# Patient Record
Sex: MALE | Race: WHITE | NOT HISPANIC OR LATINO | Employment: FULL TIME | URBAN - NONMETROPOLITAN AREA
[De-identification: names, ages, dates, MRNs, and addresses within clinical notes are randomized per-mention and may not be internally consistent; named-entity substitution may affect disease eponyms.]

---

## 2018-01-23 ENCOUNTER — OFFICE VISIT (OUTPATIENT)
Dept: CHIROPRACTIC MEDICINE | Facility: OTHER | Age: 33
End: 2018-01-23
Attending: CHIROPRACTOR
Payer: COMMERCIAL

## 2018-01-23 DIAGNOSIS — M99.02 SEGMENTAL AND SOMATIC DYSFUNCTION OF THORACIC REGION: ICD-10-CM

## 2018-01-23 DIAGNOSIS — M54.42 ACUTE LEFT-SIDED LOW BACK PAIN WITH LEFT-SIDED SCIATICA: ICD-10-CM

## 2018-01-23 DIAGNOSIS — M99.03 SEGMENTAL AND SOMATIC DYSFUNCTION OF LUMBAR REGION: Primary | ICD-10-CM

## 2018-01-23 PROCEDURE — 98940 CHIROPRACT MANJ 1-2 REGIONS: CPT | Mod: AT | Performed by: CHIROPRACTOR

## 2018-01-23 PROCEDURE — 99202 OFFICE O/P NEW SF 15 MIN: CPT | Mod: 25 | Performed by: CHIROPRACTOR

## 2018-01-23 NOTE — PROGRESS NOTES
Subjective Finding:    Chief compalint: Patient presents with:  Back Pain: left low back pain  , Pain Scale: 6/10, Intensity: sharp, Duration: 2 weeks, Radiating: down left leg.    Date of injury:     Activities that the pain restricts:   Home/household/hobbies/social activities: yes.  Work duties: yes.  Sleep: yes.  Makes symptoms better: rest.  Makes symptoms worse: activity, lumbar extension and lumbar flexion.  Have you seen anyone else for the symptoms? No.  Work related: no.  Automobile related injury: no.    Objective and Assessment:    Posture Analysis:   High shoulder:   Head tilt: .  High iliac crest: left.  Head carriage: neutral.  Thoracic Kyphosis: neutral.  Lumbar Lordosis: forward.    Lumbar Range of Motion: extension decreased and left lateral flexion decreased.  Cervical Range of Motion: .  Thoracic Range of Motion: .  Extremity Range of Motion: .    Palpation:   Quad lumb: left, referred pain: yes    Segmental dysfunction pre-treatment and treatment area: T7, L5 and PSIS Left.    Assessment post-treatment:  Cervical: .  Thoracic: ROM increased.  Lumbar: ROM increased.    Comments: .      Complicating Factors: .    Procedure(s):  [facility]:  74570 Chiropractic manipulative treatment 1-2 regions performed   Thoracic: Diversified, See above for level, Prone and Lumbar: Diversified, See above for level, Side posture    Modalities:  None performed this visit    Therapeutic procedures:  None    Plan:  Treatment plan: 1 times per week for 2 weeks.  Instructed patient: stretch as instructed at visit.  Short term goals: reduce pain.  Long term goals: restore normal function.  Prognosis: excellent.

## 2018-01-23 NOTE — MR AVS SNAPSHOT
"              After Visit Summary   1/23/2018    Darren Magallon    MRN: [id number]           Patient Information     Date Of Birth          1985        Visit Information        Provider Department      1/23/2018 8:50 AM Julien Rothman DC [facility]        Today's Diagnoses     Segmental and somatic dysfunction of lumbar region    -  1    Acute left-sided low back pain with left-sided sciatica        Segmental and somatic dysfunction of thoracic region           Follow-ups after your visit        Your next 10 appointments already scheduled     Jan 29, 2018  8:10 AM CST   Return Visit with JESÚS Young ([facility])    [address]  [facility] [address]   [phone]              Who to contact     If you have questions or need follow up information about today's clinic visit or your schedule please contact  [facility] directly at [phone].  Normal or non-critical lab and imaging results will be communicated to you by MyChart, letter or phone within 4 business days after the clinic has received the results. If you do not hear from us within 7 days, please contact the clinic through Band Metricshart or phone. If you have a critical or abnormal lab result, we will notify you by phone as soon as possible.  Submit refill requests through AcesoBee or call your pharmacy and they will forward the refill request to us. Please allow 3 business days for your refill to be completed.          Additional Information About Your Visit        MyChart Information     Aceso[employer]ee lets you send messages to your doctor, view your test results, renew your prescriptions, schedule appointments and more. To sign up, go to [email or web address] . Click on \"Log in\" on the left side of the screen, which will take you to the Welcome page. Then click on \"Sign up Now\" on the right side of the page.     You will be asked to enter the access code listed below, as well as some " personal information. Please follow the directions to create your username and password.     Your access code is: [email or web address]  Expires: 2018  9:46 AM     Your access code will  in 90 days. If you need help or a new code, please call your [place] clinic or [phone].        Care EveryWhere ID     This is your Care EveryWhere ID. This could be used by other organizations to access your [place] medical records  [id number]         Blood Pressure from Last 3 Encounters:   16 133/83   14 129/80   14 130/88    Weight from Last 3 Encounters:   No data found for Wt              We Performed the Following     CHIROPRAC MANIP,SPINAL,1-2 REGIONS        Primary Care Provider    None Specified       No primary provider on file.        Equal Access to Services     JOHNIE LUGO : Ebony Singh, renetta navarrete, derrek kaalminda wagoner, akilah sin . So [facility] [phone].    ATENCIÓN: Si habla español, tiene a qiu disposición servicios gratuitos de asistencia lingüística. Llame al [phone].    We comply with applicable federal civil rights laws and [place] laws. We do not discriminate on the basis of race, color, national origin, age, disability, sex, sexual orientation, or gender identity.            Thank you!     Thank you for choosing  [facility]  for your care. Our goal is always to provide you with excellent care. Hearing back from our patients is one way we can continue to improve our services. Please take a few minutes to complete the written survey that you may receive in the mail after your visit with us. Thank you!             Your Updated Medication List - Protect others around you: Learn how to safely use, store and throw away your medicines at www.disposemymeds.org.      Notice  As of 2018  9:46 AM    You have not been prescribed any medications.

## 2018-01-29 ENCOUNTER — OFFICE VISIT (OUTPATIENT)
Dept: CHIROPRACTIC MEDICINE | Facility: OTHER | Age: 33
End: 2018-01-29
Attending: CHIROPRACTOR
Payer: COMMERCIAL

## 2018-01-29 DIAGNOSIS — M54.50 ACUTE LEFT-SIDED LOW BACK PAIN WITHOUT SCIATICA: ICD-10-CM

## 2018-01-29 DIAGNOSIS — M99.03 SEGMENTAL AND SOMATIC DYSFUNCTION OF LUMBAR REGION: Primary | ICD-10-CM

## 2018-01-29 DIAGNOSIS — M99.02 SEGMENTAL AND SOMATIC DYSFUNCTION OF THORACIC REGION: ICD-10-CM

## 2018-01-29 PROCEDURE — 98940 CHIROPRACT MANJ 1-2 REGIONS: CPT | Mod: AT | Performed by: CHIROPRACTOR

## 2018-01-29 NOTE — PROGRESS NOTES
Subjective Finding:    Chief compalint: Patient presents with:  Back Pain: much better.  less leg pain.  still left side low back pain  , Pain Scale: 3/10, Intensity: sharp, Duration: 2 weeks, Radiating: no.    Date of injury:     Activities that the pain restricts:   Home/household/hobbies/social activities: yes.  Work duties: yes.  Sleep: yes.  Makes symptoms better: rest.  Makes symptoms worse: activity, lumbar extension and lumbar flexion.  Have you seen anyone else for the symptoms? No.  Work related: no.  Automobile related injury: no.    Objective and Assessment:    Posture Analysis:   High shoulder:   Head tilt: .  High iliac crest: left.  Head carriage: neutral.  Thoracic Kyphosis: neutral.  Lumbar Lordosis: forward.    Lumbar Range of Motion: extension decreased and left lateral flexion decreased.  Cervical Range of Motion: .  Thoracic Range of Motion: .  Extremity Range of Motion: .    Palpation:   Quad lumb: left, referred pain: yes    Segmental dysfunction pre-treatment and treatment area: T7, L5 and PSIS Left.    Assessment post-treatment:  Cervical: .  Thoracic: ROM increased.  Lumbar: ROM increased.    Comments: .      Complicating Factors: .    Procedure(s):  [facility]:  93870 Chiropractic manipulative treatment 1-2 regions performed   Thoracic: Diversified, See above for level, Prone and Lumbar: Diversified, See above for level, Side posture    Modalities:  None performed this visit    Therapeutic procedures:  None    Plan:  Treatment plan: 1 times per week for 2 weeks.  Instructed patient: stretch as instructed at visit.  Short term goals: reduce pain.  Long term goals: restore normal function.  Prognosis: excellent.

## 2018-01-29 NOTE — MR AVS SNAPSHOT
"              After Visit Summary   2018    Darren Magallon    MRN: [id number]           Patient Information     Date Of Birth          1985        Visit Information        Provider Department      2018 8:10 AM Julien Rothman DC  [facility]        Today's Diagnoses     Segmental and somatic dysfunction of lumbar region    -  1    Acute left-sided low back pain without sciatica        Segmental and somatic dysfunction of thoracic region           Follow-ups after your visit        Who to contact     If you have questions or need follow up information about today's clinic visit or your schedule please contact  [facility] directly at [phone].  Normal or non-critical lab and imaging results will be communicated to you by LiveOpshart, letter or phone within 4 business days after the clinic has received the results. If you do not hear from us within 7 days, please contact the clinic through LiveOpshart or phone. If you have a critical or abnormal lab result, we will notify you by phone as soon as possible.  Submit refill requests through Virtify or call your pharmacy and they will forward the refill request to us. Please allow 3 business days for your refill to be completed.          Additional Information About Your Visit        MyChart Information     Virtify lets you send messages to your doctor, view your test results, renew your prescriptions, schedule appointments and more. To sign up, go to [email or web address] . Click on \"Log in\" on the left side of the screen, which will take you to the Welcome page. Then click on \"Sign up Now\" on the right side of the page.     You will be asked to enter the access code listed below, as well as some personal information. Please follow the directions to create your username and password.     Your access code is: [email or web address]  Expires: 2018  9:46 AM     Your access code will  in 90 days. If you need help or a new code, please " call your [place] clinic or [phone].        Care EveryWhere ID     This is your Care EveryWhere ID. This could be used by other organizations to access your [place] medical records  [id number]         Blood Pressure from Last 3 Encounters:   06/14/16 133/83   12/08/14 129/80   08/06/14 130/88    Weight from Last 3 Encounters:   No data found for Wt              We Performed the Following     CHIROPRAC MANIP,SPINAL,1-2 REGIONS        Primary Care Provider    None Specified       No primary provider on file.        Equal Access to Services     JOHNIE [facility] : Hadii aad ku hadasho Soomaali, waaxda luqadaha, qaybta kaalmada megganyakatie, akilah sin . So [facility] [phone].    ATENCIÓN: Si habla español, tiene a qiu disposición servicios gratuitos de asistencia lingüística. Llame al [phone].    We comply with applicable federal civil rights laws and [place] laws. We do not discriminate on the basis of race, color, national origin, age, disability, sex, sexual orientation, or gender identity.            Thank you!     Thank you for choosing  [facility]  for your care. Our goal is always to provide you with excellent care. Hearing back from our patients is one way we can continue to improve our services. Please take a few minutes to complete the written survey that you may receive in the mail after your visit with us. Thank you!             Your Updated Medication List - Protect others around you: Learn how to safely use, store and throw away your medicines at www.disposemymeds.org.      Notice  As of 1/29/2018  8:19 AM    You have not been prescribed any medications.

## 2018-10-26 ENCOUNTER — HOSPITAL ENCOUNTER (EMERGENCY)
Facility: HOSPITAL | Age: 33
Discharge: HOME | End: 2018-10-26
Attending: NURSE PRACTITIONER | Admitting: NURSE PRACTITIONER

## 2018-10-26 ENCOUNTER — APPOINTMENT (OUTPATIENT)
Dept: GENERAL RADIOLOGY | Facility: HOSPITAL | Age: 33
End: 2018-10-26
Attending: NURSE PRACTITIONER

## 2018-10-26 VITALS
SYSTOLIC BLOOD PRESSURE: 157 MMHG | DIASTOLIC BLOOD PRESSURE: 91 MMHG | HEART RATE: 105 BPM | OXYGEN SATURATION: 98 % | RESPIRATION RATE: 16 BRPM | TEMPERATURE: 97.6 F

## 2018-10-26 DIAGNOSIS — J40 BRONCHITIS: ICD-10-CM

## 2018-10-26 LAB
ANION GAP SERPL CALCULATED.3IONS-SCNC: 8 MMOL/L (ref 3–14)
BASOPHILS # BLD AUTO: 0 10E9/L (ref 0–0.2)
BASOPHILS NFR BLD AUTO: 0.4 %
BUN SERPL-MCNC: 10 MG/DL (ref 7–30)
CALCIUM SERPL-MCNC: 8.6 MG/DL (ref 8.5–10.1)
CHLORIDE SERPL-SCNC: 108 MMOL/L (ref 94–109)
CO2 SERPL-SCNC: 25 MMOL/L (ref 20–32)
CREAT SERPL-MCNC: 1.04 MG/DL (ref 0.66–1.25)
DIFFERENTIAL METHOD BLD: NORMAL
EOSINOPHIL # BLD AUTO: 0.2 10E9/L (ref 0–0.7)
EOSINOPHIL NFR BLD AUTO: 2.1 %
ERYTHROCYTE [DISTWIDTH] IN BLOOD BY AUTOMATED COUNT: 12.1 % (ref 10–15)
GFR SERPL CREATININE-BSD FRML MDRD: 82 ML/MIN/1.7M2
GLUCOSE SERPL-MCNC: 103 MG/DL (ref 70–99)
HCT VFR BLD AUTO: 40.4 % (ref 40–53)
HGB BLD-MCNC: 13.9 G/DL (ref 13.3–17.7)
IMM GRANULOCYTES # BLD: 0 10E9/L (ref 0–0.4)
IMM GRANULOCYTES NFR BLD: 0.3 %
LYMPHOCYTES # BLD AUTO: 2.4 10E9/L (ref 0.8–5.3)
LYMPHOCYTES NFR BLD AUTO: 25.9 %
MCH RBC QN AUTO: 28.7 PG (ref 26.5–33)
MCHC RBC AUTO-ENTMCNC: 34.4 G/DL (ref 31.5–36.5)
MCV RBC AUTO: 83 FL (ref 78–100)
MONOCYTES # BLD AUTO: 0.7 10E9/L (ref 0–1.3)
MONOCYTES NFR BLD AUTO: 7.7 %
NEUTROPHILS # BLD AUTO: 5.8 10E9/L (ref 1.6–8.3)
NEUTROPHILS NFR BLD AUTO: 63.6 %
NRBC # BLD AUTO: 0 10*3/UL
NRBC BLD AUTO-RTO: 0 /100
PLATELET # BLD AUTO: 286 10E9/L (ref 150–450)
POTASSIUM SERPL-SCNC: 3.6 MMOL/L (ref 3.4–5.3)
RBC # BLD AUTO: 4.85 10E12/L (ref 4.4–5.9)
SODIUM SERPL-SCNC: 141 MMOL/L (ref 133–144)
WBC # BLD AUTO: 9.1 10E9/L (ref 4–11)

## 2018-10-26 PROCEDURE — 85025 COMPLETE CBC W/AUTO DIFF WBC: CPT | Performed by: NURSE PRACTITIONER

## 2018-10-26 PROCEDURE — 71046 X-RAY EXAM CHEST 2 VIEWS: CPT | Mod: TC

## 2018-10-26 PROCEDURE — 99213 OFFICE O/P EST LOW 20 MIN: CPT | Performed by: NURSE PRACTITIONER

## 2018-10-26 PROCEDURE — G0463 HOSPITAL OUTPT CLINIC VISIT: HCPCS | Mod: 25

## 2018-10-26 PROCEDURE — 80048 BASIC METABOLIC PNL TOTAL CA: CPT | Performed by: NURSE PRACTITIONER

## 2018-10-26 PROCEDURE — 36415 COLL VENOUS BLD VENIPUNCTURE: CPT | Performed by: NURSE PRACTITIONER

## 2018-10-26 RX ORDER — AZITHROMYCIN 250 MG/1
TABLET, FILM COATED ORAL
Qty: 6 TABLET | Refills: 0 | Status: SHIPPED | OUTPATIENT
Start: 2018-10-26 | End: 2018-10-31

## 2018-10-26 RX ORDER — PREDNISONE 20 MG/1
TABLET ORAL
Qty: 10 TABLET | Refills: 0 | Status: SHIPPED | OUTPATIENT
Start: 2018-10-26 | End: 2019-04-26

## 2018-10-26 ASSESSMENT — ENCOUNTER SYMPTOMS
VOMITING: 0
WHEEZING: 0
CHEST TIGHTNESS: 0
ABDOMINAL PAIN: 0
COUGH: 1
RHINORRHEA: 1
SORE THROAT: 1
SINUS PRESSURE: 1
FATIGUE: 1
FEVER: 0
MUSCULOSKELETAL NEGATIVE: 1
SHORTNESS OF BREATH: 1

## 2018-10-26 NOTE — ED AVS SNAPSHOT
[facility]    [address]    [place] [address]    Phone:  [phone]                                       Darren Magallon   MRN: [id number]    Department[facility]  [facility]   Date of Visit:  10/26/2018           Patient Information     Date Of Birth          1985        Your diagnoses for this visit were:     Bronchitis        You were seen by Ellen Marrero NP.      Follow-up Information     Please follow up.    Why:  Follow up if not improving in 3-5 days        Discharge Instructions         Bronchitis, Antibiotic Treatment (Adult)    Bronchitis is an infection of the air passages (bronchial tubes) in your lungs. It often occurs when you have a cold. This illness is contagious during the first few days and is spread through the air by coughing and sneezing, or by direct contact (touching the sick person and then touching your own eyes, nose, or mouth).  Symptoms of bronchitis include cough with mucus (phlegm) and low-grade fever. Bronchitis usually lasts 7 to 14 days. Mild cases can be treated with simple home remedies. More severe infection is treated with an antibiotic.  Home care  Follow these guidelines when caring for yourself at home:    If your symptoms are severe, rest at home for the first 2 to 3 days. When you go back to your usual activities, don't let yourself get too tired.    Do not smoke. Also avoid being exposed to secondhand smoke.    You may use over-the-counter medicines to control fever or pain, unless another medicine was prescribed. If you have chronic liver or kidney disease or have ever had a stomach ulcer or gastrointestinal bleeding, talk with your healthcare provider before using these medicines. Also talk to your provider if you are taking medicine to prevent blood clots. Aspirin should never be given to anyone younger than 18 years of age who is ill with a viral infection or fever. It may cause severe liver or brain damage.    Your  appetite may be poor, so a light diet is fine. Avoid dehydration by drinking 6 to 8 glasses of fluids per day (such as water, soft drinks, sports drinks, juices, tea, or soup). Extra fluids will help loosen secretions in the nose and lungs.    Over-the-counter cough, cold, and sore-throat medicines will not shorten the length of the illness, but they may be helpful to reduce symptoms. (Note: Do not use decongestants if you have high blood pressure.)    Finish all antibiotic medicine. Do this even if you are feeling better after only a few days.  Follow-up care  Follow up with your healthcare provider, or as advised. If you had an X-ray or ECG (electrocardiogram), a specialist will review it. You will be notified of any new findings that may affect your care.  If you are age 65 or older, or if you have a chronic lung disease or condition that affects your immune system, or you smoke, ask your healthcare provider about getting a pneumococcal vaccine and a yearly flu shot (influenza vaccine).  When to seek medical advice  Call your healthcare provider right away if any of these occur:    Fever of 100.4 F (38 C) or higher, or as directed by your healthcare provider    Coughing up increased amounts of colored sputum    Weakness, drowsiness, headache, facial pain, ear pain, or a stiff neck  Call 911  Call 911 if any of these occur.    Coughing up blood    Worsening weakness, drowsiness, headache, or stiff neck    Trouble breathing, wheezing, or pain with breathing  Date Last Reviewed: 9/13/2015 2000-2017 [employer]. [address] [place] [address]. All rights reserved. This information is not intended as a substitute for professional medical care. Always follow your healthcare professional's instructions.             Review of your medicines      START taking        Dose / Directions Last dose taken    azithromycin 250 MG tablet   Commonly known as:  ZITHROMAX Z-THERESE   Quantity:  6 tablet         "Two tablets on the first day, then one tablet daily for the next 4 days   Refills:  0        predniSONE 20 MG tablet   Commonly known as:  DELTASONE   Quantity:  10 tablet        Take two tablets (= 40mg) each day for 5 (five) days   Refills:  0                Prescriptions were sent or printed at these locations (2 Prescriptions)                   [facility] - [place] - [address] AT [facility] [address]   [address], [place] [address]    Telephone:  [phone]   Fax:  [phone]   Hours:                  E-Prescribed (2 of 2)         azithromycin (ZITHROMAX Z-THERESE) 250 MG tablet               predniSONE (DELTASONE) 20 MG tablet                Procedures and tests performed during your visit     Basic metabolic panel    CBC with platelets differential    XR Chest 2 Views      Orders Needing Specimen Collection     None      Pending Results     No orders found from 10/24/2018 to 10/27/2018.            Pending Culture Results     No orders found from 10/24/2018 to 10/27/2018.            Thank you for choosing [facility]       Thank you for choosing [facility] for your care. Our goal is always to provide you with excellent care. Hearing back from our patients is one way we can continue to improve our services. Please take a few minutes to complete the written survey that you may receive in the mail after you visit with us. Thank you!        [employer] Information     [employer] lets you send messages to your doctor, view your test results, renew your prescriptions, schedule appointments and more. To sign up, go to [email or web address] . Click on \"Log in\" on the left side of the screen, which will take you to the Welcome page. Then click on \"Sign up Now\" on the right side of the page.     You will be asked to enter the access code listed below, as well as some personal information. Please follow the directions to create your username and password.     Your access code is: " [email or web address]  Expires: 2019  8:25 PM     Your access code will  in 90 days. If you need help or a new code, please call your [place] clinic or [phone].        Care EveryWhere ID     This is your Care EveryWhere ID. This could be used by other organizations to access your [place] medical records  [id number]        Equal Access to Services     [facility] : Hadii lillie reich Sojason, waaxda luqadaha, qaybta kaalmada adedamonyada, akilah sin . So [facility] [phone].    ATENCIÓN: Si habla español, tiene a qiu disposición servicios gratuitos de asistencia lingüística. Llame al [phone].    We comply with applicable federal civil rights laws and [place] laws. We do not discriminate on the basis of race, color, national origin, age, disability, sex, sexual orientation, or gender identity.            After Visit Summary       This is your record. Keep this with you and show to your community pharmacist(s) and doctor(s) at your next visit.

## 2018-10-26 NOTE — ED AVS SNAPSHOT
[facility]    [address] [address]    Phone:  [phone]                                       Darren Magallon   MRN: [id number]    Department:  [facility]   Date of Visit:  10/26/2018           After Visit Summary Signature Page     I have received my discharge instructions, and my questions have been answered. I have discussed any challenges I see with this plan with the nurse or doctor.    ..........................................................................................................................................  Patient/Patient Representative Signature      ..........................................................................................................................................  Patient Representative Print Name and Relationship to Patient    ..................................................               ................................................  Date                                   Time    ..........................................................................................................................................  Reviewed by Signature/Title    ...................................................              ..............................................  Date                                               Time          22EPIC Rev 08/18

## 2018-10-27 NOTE — DISCHARGE INSTRUCTIONS

## 2018-10-27 NOTE — ED TRIAGE NOTES
Pt is here with his wife. Patient states that he has been fighting a cold for approximately a month. States that at this time it is getting hard to breathe through nose and he has a deep cough that hurting his chest.

## 2018-10-27 NOTE — ED PROVIDER NOTES
History     Chief Complaint   Patient presents with     Cough     for one month     The history is provided by the patient. No  was used.     Darren Magallon is a 33 year old male who presents with cough x 1 month. Has sinus congestion. Was taking benadryl for the sinus drainage. Hasn't taken any ibuprofen or Tylenol. Has pain in his left ear. Has pain in his chest with coughing. Gets SOB outside with activity then can't stop coughing.   Eating and drinking well. Hasn't checked his temp.  Smokes 1/2 ppd, less with illness. No PCP, is rarely in for illness.     Problem List:    There are no active problems to display for this patient.       Past Medical History:    No past medical history on file.    Past Surgical History:    No past surgical history on file.    Family History:    No family history on file.    Social History:  Marital Status:  Single [1]  Social History   Substance Use Topics     Smoking status: Current Every Day Smoker     Packs/day: 0.25     Smokeless tobacco: Not on file     Alcohol use No        Medications:      azithromycin (ZITHROMAX Z-THERESE) 250 MG tablet   predniSONE (DELTASONE) 20 MG tablet         Review of Systems   Constitutional: Positive for fatigue. Negative for fever.   HENT: Positive for congestion, postnasal drip, rhinorrhea, sinus pressure and sore throat. Negative for ear pain.    Respiratory: Positive for cough and shortness of breath. Negative for chest tightness and wheezing.    Cardiovascular:        Chest wall pain with coughing   Gastrointestinal: Negative for abdominal pain and vomiting.   Musculoskeletal: Negative.        Physical Exam   BP: 157/91  Pulse: 105  Temp: 97.6  F (36.4  C)  Resp: 16  SpO2: 98 %      Physical Exam   Constitutional: He is oriented to person, place, and time. He appears well-developed and well-nourished. No distress.   HENT:   Head: Normocephalic and atraumatic.   Right Ear: Tympanic membrane and external ear normal.   Left  Ear: Tympanic membrane and external ear normal.   Nose: Nose normal.   Mouth/Throat: Uvula is midline, oropharynx is clear and moist and mucous membranes are normal. No oropharyngeal exudate.   Eyes: Conjunctivae and lids are normal. Right eye exhibits no discharge. Left eye exhibits no discharge. No scleral icterus.   Neck: Normal range of motion. Neck supple.   Cardiovascular: Regular rhythm and normal heart sounds.  Tachycardia present.    Pulmonary/Chest: Effort normal and breath sounds normal. He has no wheezes.   Frequent, harsh dry cough   Musculoskeletal: Normal range of motion.   Lymphadenopathy:        Head (right side): Tonsillar adenopathy present.        Head (left side): No tonsillar adenopathy present.   Neurological: He is alert and oriented to person, place, and time.   Skin: Skin is warm and dry. He is not diaphoretic.   Psychiatric: He has a normal mood and affect.   Nursing note and vitals reviewed.      ED Course     ED Course     Procedures  Chest XR: I personally reviewed the x-rays and find a  clear chest.   Radiology review pending and nurse will notify patient if there is any change in the treatment plan.      Results for orders placed or performed during the hospital encounter of 10/26/18 (from the past 24 hour(s))   CBC with platelets differential   Result Value Ref Range    WBC 9.1 4.0 - 11.0 10e9/L    RBC Count 4.85 4.4 - 5.9 10e12/L    Hemoglobin 13.9 13.3 - 17.7 g/dL    Hematocrit 40.4 40.0 - 53.0 %    MCV 83 78 - 100 fl    MCH 28.7 26.5 - 33.0 pg    MCHC 34.4 31.5 - 36.5 g/dL    RDW 12.1 10.0 - 15.0 %    Platelet Count 286 150 - 450 10e9/L    Diff Method Automated Method     % Neutrophils 63.6 %    % Lymphocytes 25.9 %    % Monocytes 7.7 %    % Eosinophils 2.1 %    % Basophils 0.4 %    % Immature Granulocytes 0.3 %    Nucleated RBCs 0 0 /100    Absolute Neutrophil 5.8 1.6 - 8.3 10e9/L    Absolute Lymphocytes 2.4 0.8 - 5.3 10e9/L    Absolute Monocytes 0.7 0.0 - 1.3 10e9/L    Absolute  Eosinophils 0.2 0.0 - 0.7 10e9/L    Absolute Basophils 0.0 0.0 - 0.2 10e9/L    Abs Immature Granulocytes 0.0 0 - 0.4 10e9/L    Absolute Nucleated RBC 0.0    Basic metabolic panel   Result Value Ref Range    Sodium 141 133 - 144 mmol/L    Potassium 3.6 3.4 - 5.3 mmol/L    Chloride 108 94 - 109 mmol/L    Carbon Dioxide 25 20 - 32 mmol/L    Anion Gap 8 3 - 14 mmol/L    Glucose 103 (H) 70 - 99 mg/dL    Urea Nitrogen 10 7 - 30 mg/dL    Creatinine 1.04 0.66 - 1.25 mg/dL    GFR Estimate 82 >60 mL/min/1.7m2    GFR Estimate If Black >90 >60 mL/min/1.7m2    Calcium 8.6 8.5 - 10.1 mg/dL   XR Chest 2 Views    Narrative    PROCEDURE:  XR CHEST 2 VW    HISTORY:  Cough x 1 month; .     COMPARISON:  None.    FINDINGS:   The cardiac silhouette is normal in size. The pulmonary vasculature is  normal.  The lungs are clear. No pleural effusion or pneumothorax.      Impression    IMPRESSION:  No acute cardiopulmonary disease.      VAIBHAV ANDREW MD       Medications - No data to display    Assessments & Plan (with Medical Decision Making)     I have reviewed the nursing notes.  I have reviewed the findings, diagnosis, plan and need for follow up with the patient.  Cough x 1 month. Benign exam.   Labs and chest are benign.   Will treat due to duration of cough.   Given Epic educational materials.   Rest and push fluids.  F/u in 3-5 days if not improving.     New Prescriptions    AZITHROMYCIN (ZITHROMAX Z-THERESE) 250 MG TABLET    Two tablets on the first day, then one tablet daily for the next 4 days    PREDNISONE (DELTASONE) 20 MG TABLET    Take two tablets (= 40mg) each day for 5 (five) days       Final diagnoses:   Bronchitis       10/26/2018   [facility]             Ellen Marrero NP  10/26/18 2032

## 2019-04-26 ENCOUNTER — HOSPITAL ENCOUNTER (EMERGENCY)
Facility: HOSPITAL | Age: 34
Discharge: HOME | End: 2019-04-26
Attending: NURSE PRACTITIONER | Admitting: NURSE PRACTITIONER
Payer: COMMERCIAL

## 2019-04-26 VITALS
OXYGEN SATURATION: 100 % | HEART RATE: 106 BPM | RESPIRATION RATE: 16 BRPM | DIASTOLIC BLOOD PRESSURE: 93 MMHG | TEMPERATURE: 98.6 F | SYSTOLIC BLOOD PRESSURE: 134 MMHG

## 2019-04-26 DIAGNOSIS — J20.9 ACUTE BRONCHITIS WITH SYMPTOMS > 10 DAYS: ICD-10-CM

## 2019-04-26 PROCEDURE — 99213 OFFICE O/P EST LOW 20 MIN: CPT | Performed by: NURSE PRACTITIONER

## 2019-04-26 PROCEDURE — G0463 HOSPITAL OUTPT CLINIC VISIT: HCPCS | Mod: 25

## 2019-04-26 PROCEDURE — 94664 DEMO&/EVAL PT USE INHALER: CPT

## 2019-04-26 RX ORDER — ALBUTEROL SULFATE 90 UG/1
2 AEROSOL, METERED RESPIRATORY (INHALATION) EVERY 6 HOURS PRN
Qty: 18 G | Refills: 0 | Status: SHIPPED | OUTPATIENT
Start: 2019-04-26 | End: 2022-02-15

## 2019-04-26 RX ORDER — ACETAMINOPHEN 325 MG/1
325-650 TABLET ORAL EVERY 6 HOURS PRN
COMMUNITY

## 2019-04-26 RX ORDER — AZITHROMYCIN 250 MG/1
TABLET, FILM COATED ORAL
Qty: 6 TABLET | Refills: 0 | Status: SHIPPED | OUTPATIENT
Start: 2019-04-26 | End: 2019-05-01

## 2019-04-26 RX ORDER — BENZONATATE 100 MG/1
100 CAPSULE ORAL 3 TIMES DAILY PRN
Qty: 30 CAPSULE | Refills: 0 | Status: SHIPPED | OUTPATIENT
Start: 2019-04-26 | End: 2022-09-16

## 2019-04-26 ASSESSMENT — ENCOUNTER SYMPTOMS
NECK PAIN: 0
VOMITING: 0
WHEEZING: 0
DYSURIA: 0
ACTIVITY CHANGE: 0
RHINORRHEA: 0
SINUS PRESSURE: 0
COUGH: 1
PSYCHIATRIC NEGATIVE: 1
FEVER: 0
STRIDOR: 0
NECK STIFFNESS: 0
DIARRHEA: 0
TROUBLE SWALLOWING: 0
SINUS PAIN: 0
WEAKNESS: 0
CHILLS: 0
ABDOMINAL PAIN: 0
APPETITE CHANGE: 0

## 2019-04-26 NOTE — ED AVS SNAPSHOT
[facility]  [address] [address]  Phone:  [phone]                                    Darren Magallon   MRN: [id number]    Department:  [facility]   Date of Visit:  4/26/2019           After Visit Summary Signature Page    I have received my discharge instructions, and my questions have been answered. I have discussed any challenges I see with this plan with the nurse or doctor.    ..........................................................................................................................................  Patient/Patient Representative Signature      ..........................................................................................................................................  Patient Representative Print Name and Relationship to Patient    ..................................................               ................................................  Date                                   Time    ..........................................................................................................................................  Reviewed by Signature/Title    ...................................................              ..............................................  Date                                               Time          22EPIC Rev 08/18

## 2019-04-27 ASSESSMENT — ENCOUNTER SYMPTOMS
SORE THROAT: 1
NUMBNESS: 0
SHORTNESS OF BREATH: 1

## 2019-04-27 NOTE — DISCHARGE INSTRUCTIONS
Take antibiotic as ordered.   Eat a yogurt daily while taking antibiotics.   Take Tessalon as needed as directed.   Use Albuterol inhaler as needed as directed.   Increase fluid intake.   Rest.   Follow up with PCP with any increase in symptoms or concerns.   Return to urgent care or emergency department with any increase in symptoms or concerns.

## 2019-04-27 NOTE — ED PROVIDER NOTES
History     Chief Complaint   Patient presents with     URI     Onset two weeks ago with no improvement.     The history is provided by the patient and the spouse. No  was used.     Darren Magallon is a 33 year old male who presents with a cough that started 2 weeks ago. He's taken ibuprofen and cough drops with mild effectiveness. Denies fever, chills, or night sweats. Eating and drinking well. Bowel and bladder are working well. No antibiotic use in the past 30 days. He is a tobacco user and smokes 1/3 PPD of cigarettes.       Allergies:  No Known Allergies    Problem List:    There are no active problems to display for this patient.       Past Medical History:    No past medical history on file.    Past Surgical History:    No past surgical history on file.    Family History:    No family history on file.    Social History:  Marital Status:  Single [1]  Social History     Tobacco Use     Smoking status: Current Every Day Smoker     Packs/day: 0.25   Substance Use Topics     Alcohol use: No     Drug use: No        Medications:      acetaminophen (TYLENOL) 325 MG tablet   albuterol (PROAIR HFA/PROVENTIL HFA/VENTOLIN HFA) 108 (90 Base) MCG/ACT inhaler   azithromycin (ZITHROMAX Z-THERESE) 250 MG tablet   benzonatate (TESSALON) 100 MG capsule         Review of Systems   Constitutional: Negative for activity change, appetite change, chills and fever.   HENT: Positive for congestion and sore throat. Negative for ear discharge, ear pain, rhinorrhea, sinus pressure, sinus pain and trouble swallowing.    Respiratory: Positive for cough and shortness of breath. Negative for wheezing and stridor.         SOB with activity.    Cardiovascular: Negative for chest pain.   Gastrointestinal: Negative for abdominal pain, diarrhea and vomiting.   Genitourinary: Negative for dysuria.   Musculoskeletal: Negative for neck pain and neck stiffness.   Skin: Negative for rash.   Neurological: Negative for weakness and  numbness.   Psychiatric/Behavioral: Negative.        Physical Exam   BP: 134/93  Pulse: 106  Temp: 98.6  F (37  C)  Resp: 16  SpO2: 100 %      Physical Exam   Constitutional: He is oriented to person, place, and time. He appears well-developed and well-nourished. No distress.   HENT:   Head: Normocephalic.   Right Ear: External ear normal.   Left Ear: External ear normal.   Mouth/Throat: Oropharynx is clear and moist. No oropharyngeal exudate.   Neck: Normal range of motion. Neck supple.   Cardiovascular: Normal rate, regular rhythm and normal heart sounds.   No murmur heard.  Pulmonary/Chest: Effort normal. No stridor. No respiratory distress. He has no wheezes. He has rales.   Abdominal: Soft. He exhibits no distension.   Musculoskeletal: Normal range of motion.   Lymphadenopathy:     He has no cervical adenopathy.   Neurological: He is alert and oriented to person, place, and time. He exhibits normal muscle tone.   Skin: Skin is warm and dry. Capillary refill takes less than 2 seconds. No rash noted. He is not diaphoretic.   Psychiatric: He has a normal mood and affect. His behavior is normal.   Nursing note and vitals reviewed.      ED Course     Procedures    RT instructed on inhaler use with spacer.     Assessments & Plan (with Medical Decision Making)     Discussed plan of care. He verbalized understanding. All questions answered.     I have reviewed the nursing notes.    I have reviewed the findings, diagnosis, plan and need for follow up with the patient.  Discharged in stable condition.        Medication List      Started    albuterol 108 (90 Base) MCG/ACT inhaler  Commonly known as:  PROAIR HFA/PROVENTIL HFA/VENTOLIN HFA  2 puffs, Inhalation, EVERY 6 HOURS PRN     azithromycin 250 MG tablet  Commonly known as:  ZITHROMAX Z-THERESE  Two tablets on the first day, then one tablet daily for the next 4 days     benzonatate 100 MG capsule  Commonly known as:  TESSALON  100 mg, Oral, 3 TIMES DAILY PRN             Final diagnoses:   Acute bronchitis with symptoms > 10 days     Take antibiotic as ordered.   Eat a yogurt daily while taking antibiotics.   Take Tessalon as needed as directed.   Use Albuterol inhaler as needed as directed.   Increase fluid intake.   Rest.   Follow up with PCP with any increase in symptoms or concerns.   Return to urgent care or emergency department with any increase in symptoms or concerns.     Haylie OJEDA, FNP  4/26/2019  7:18 PM  [facility]       Haylie Rowan NP  04/27/19 1116       Haylie Rowan NP  04/27/19 1116

## 2019-04-27 NOTE — ED TRIAGE NOTES
Pt presents today with significant other and children for c/o 2 wk hx of URI s/s without improvement.

## 2022-01-24 ENCOUNTER — HOSPITAL ENCOUNTER (EMERGENCY)
Facility: HOSPITAL | Age: 37
Discharge: HOME | End: 2022-01-24
Attending: NURSE PRACTITIONER | Admitting: NURSE PRACTITIONER
Payer: COMMERCIAL

## 2022-01-24 VITALS
HEART RATE: 111 BPM | RESPIRATION RATE: 18 BRPM | OXYGEN SATURATION: 95 % | SYSTOLIC BLOOD PRESSURE: 122 MMHG | TEMPERATURE: 99.3 F | DIASTOLIC BLOOD PRESSURE: 81 MMHG

## 2022-01-24 DIAGNOSIS — J40 BRONCHITIS: ICD-10-CM

## 2022-01-24 PROCEDURE — 99213 OFFICE O/P EST LOW 20 MIN: CPT | Performed by: NURSE PRACTITIONER

## 2022-01-24 PROCEDURE — G0463 HOSPITAL OUTPT CLINIC VISIT: HCPCS

## 2022-01-24 PROCEDURE — G0463 HOSPITAL OUTPT CLINIC VISIT: HCPCS | Mod: 25

## 2022-01-24 RX ORDER — BENZONATATE 100 MG/1
200 CAPSULE ORAL 3 TIMES DAILY PRN
Qty: 12 CAPSULE | Refills: 0 | Status: SHIPPED | OUTPATIENT
Start: 2022-01-24 | End: 2022-01-27

## 2022-01-24 RX ORDER — INHALER, ASSIST DEVICES
SPACER (EA) MISCELLANEOUS
Qty: 1 EACH | Refills: 0 | Status: SHIPPED | OUTPATIENT
Start: 2022-01-24 | End: 2022-09-16

## 2022-01-24 RX ORDER — AZITHROMYCIN 250 MG/1
TABLET, FILM COATED ORAL
Qty: 6 TABLET | Refills: 0 | Status: SHIPPED | OUTPATIENT
Start: 2022-01-24 | End: 2022-01-29

## 2022-01-24 RX ORDER — ALBUTEROL SULFATE 90 UG/1
2 AEROSOL, METERED RESPIRATORY (INHALATION) EVERY 4 HOURS PRN
Qty: 8.5 G | Refills: 0 | Status: SHIPPED | OUTPATIENT
Start: 2022-01-24 | End: 2022-09-16

## 2022-01-24 ASSESSMENT — ENCOUNTER SYMPTOMS
CHEST TIGHTNESS: 1
LIGHT-HEADEDNESS: 0
CHILLS: 1
MYALGIAS: 0
ACTIVITY CHANGE: 1
EYES NEGATIVE: 1
DIZZINESS: 0
VOMITING: 0
FATIGUE: 0
RHINORRHEA: 0
SINUS PRESSURE: 0
SINUS PAIN: 0
SORE THROAT: 0
NAUSEA: 0
APPETITE CHANGE: 0
FEVER: 1
HEADACHES: 0
SHORTNESS OF BREATH: 0
COUGH: 1
DIARRHEA: 0

## 2022-01-24 NOTE — ED TRIAGE NOTES
Pt presents with c/o cough and chest pressure/pain only when he coughs, cold chills also. Sx started last week and got worse yesterday. Denies covid exposure. Pt is not vaccinated. Pt has been taking cough medicine and ibuprofen. Denies covid test at this time. More concerned about bronchitis.

## 2022-01-24 NOTE — ED TRIAGE NOTES
"C/o cough and fever for one week. States having chest wall pain when coughing. Denies pain currently. States \"it only hurts when I cough, I would rate it a 6 when I cough.\"   "

## 2022-01-24 NOTE — DISCHARGE INSTRUCTIONS
Increase oral intake, cool mist vaporizer as needed, rest, avoid sharing utensils, practice good hand washing techniques, cover mouth when you cough and sneeze. Throw toothbursh away 24 hours after starting antibiotics.  Over the counter medications such as ibuprofen and/or acetaminophen for fever and generalized aches and pains. Ibuprofen 400 to 800 mg (2 - 4 tabs of over the counter med) every six to eight hours as needed;not to exceed maximum amount of 3200 mg in 24 hours.Tylenol 650 to 1000 mg every four to six hours as needed (not to exceed more than 4000 mg in a 24 hour period). May use interchangeably. Robitussin (guaifenesin) for cough. Chest rubs such as Manohar's or Mentholatum may help reduce sore throat symptoms.  Chloraseptic spray for sore throat or menthol lozenges may be helpful for sore throat. Be reevaluated if symptoms persist longer than 10 - 14 days or worsen and if there is no improvement in 72 hours or worsening of symptoms.  Increase fluids. Complete all antibiotics even if feeling better. Taking antibiotics with food may decrease the stomach upset that can occur when taking antibiotics. Antibiotics frequently cause diarrhea. Probiotics or yogurt may help prevent or decrease these symptoms.     OTC decongestants (oral or topical).  Decongestants (oral or topical) cause vasoconstriction of the nasal mucosa.  We prefer oral pseudoephedrine to phenylephrine and other oral OTC nasal decongestants. Side effects of oral decongestants may include tachycardia, elevated diastolic blood pressure, and palpitations. Pseudoephedrine 30 to 60 mg every four to six hours as needed for congestion. (Maximum dose is 240 mg in 24 hours). Do not use longer than 72 hours.    Commonly used topical decongestants include oxymetazoline, xylometazoline, and phenylephrine. Side effects of topical decongestants include nosebleeds and drying of the nasal membranes. Topical decongestants should only be used for two to three  days; longer use may result in rebound nasal congestion after discontinuation.    Fluids, herbs, and foods for sore throat relief -- Adjusting the temperature and texture of foods and beverages may provide local relief of sore throat pain. While data showing benefit are quite limited, these approaches are intuitive. We typically advise these measures since they are likely to be safe with minimal adverse effect, and patients often describe relief of symptoms.  We suggest hydration with frozen (eg, ice or popsicles) or heated liquids (eg, teas, soups), rather than room temperature or refrigerated fluids in patient with significant sore throat pain. Very cold foods can have a numbing-like effect that temporarily reduces or alleviates the pain of swallowing. Ice cubes or frozen popsicles facilitate hydration; ice cream and frozen yogurt provide caloric intake.  Warm fluids and foods, including teas, soups, and soft non-irritating foods, may be better tolerated by patients with throat pain than irritating foods (eg, rough-textured or spicy foods) or fluids at room temperatures. Foods that coat the throat, including honey and hard candies, can facilitate intake of calories while temporarily relieving throat pain.

## 2022-01-24 NOTE — ED PROVIDER NOTES
History     Chief Complaint   Patient presents with     Cough     for one week     Chest Wall Pain     only when coughing     Fever     off and on for a week     HPI  Darren Magallon is a 36 year old male who presents with a 7 to 10-day history of chills, fever, nasal congestion, chest tightness and a cough.  Took cough medication this morning that did help to decrease his symptoms.  Symptoms worsening in the past day.  No known sick contacts.  Has not had Covid vaccination.  Smoker.  Denies fatigue, nausea, vomiting, diarrhea, and shortness of breath.    Allergies:  No Known Allergies    Problem List:    There are no problems to display for this patient.       Past Medical History:    No past medical history on file.    Past Surgical History:    No past surgical history on file.    Family History:    No family history on file.    Social History:  Marital Status:  Single [1]  Social History     Tobacco Use     Smoking status: Current Every Day Smoker     Packs/day: 0.25     Smokeless tobacco: Not on file   Substance Use Topics     Alcohol use: No     Drug use: No        Medications:    acetaminophen (TYLENOL) 325 MG tablet  albuterol (PROAIR HFA) 108 (90 Base) MCG/ACT inhaler  albuterol (PROAIR HFA/PROVENTIL HFA/VENTOLIN HFA) 108 (90 Base) MCG/ACT inhaler  azithromycin (ZITHROMAX) 250 MG tablet  benzonatate (TESSALON) 100 MG capsule  spacer (OPTICHAMBER CANDICE) holding chamber  benzonatate (TESSALON) 100 MG capsule          Review of Systems   Constitutional: Positive for activity change, chills and fever. Negative for appetite change and fatigue.   HENT: Positive for congestion (nasal). Negative for ear pain, rhinorrhea, sinus pressure, sinus pain and sore throat.    Eyes: Negative.    Respiratory: Positive for cough and chest tightness. Negative for shortness of breath.    Gastrointestinal: Negative for diarrhea, nausea and vomiting.   Genitourinary: Negative.    Musculoskeletal: Negative for myalgias.    Skin: Negative.    Neurological: Negative for dizziness, light-headedness and headaches.       Physical Exam   BP: 122/81  Pulse: 111  Temp: 99.3  F (37.4  C)  Resp: 18  SpO2: 95 %      Physical Exam  Vitals and nursing note reviewed.   Constitutional:       General: He is in acute distress (Mild to moderate).      Appearance: He is overweight.   HENT:      Head: Normocephalic.      Right Ear: Tympanic membrane and ear canal normal.      Left Ear: Tympanic membrane and ear canal normal.      Nose: Nose normal.      Right Sinus: No maxillary sinus tenderness or frontal sinus tenderness.      Left Sinus: No maxillary sinus tenderness or frontal sinus tenderness.      Mouth/Throat:      Lips: Pink.      Mouth: Mucous membranes are moist.      Pharynx: Uvula midline. No posterior oropharyngeal erythema.   Eyes:      Conjunctiva/sclera: Conjunctivae normal.   Cardiovascular:      Rate and Rhythm: Normal rate and regular rhythm.      Heart sounds: Normal heart sounds. No murmur heard.      Pulmonary:      Effort: Pulmonary effort is normal. No respiratory distress.      Breath sounds: Normal air entry. Examination of the right-lower field reveals rhonchi. Examination of the left-lower field reveals rhonchi. Rhonchi present. No wheezing.   Lymphadenopathy:      Cervical: No cervical adenopathy.   Skin:     General: Skin is warm and dry.   Neurological:      Mental Status: He is alert and oriented to person, place, and time.   Psychiatric:         Behavior: Behavior normal.         ED Course                 Procedures             No results found for this or any previous visit (from the past 24 hour(s)).    Medications - No data to display    Assessments & Plan (with Medical Decision Making)     I have reviewed the nursing notes.    I have reviewed the findings, diagnosis, plan and need for follow up with the patient.  (J40) Bronchitis  Comment: 36 year old male who presents with a 7 to 10-day history of chills, fever,  nasal congestion, chest tightness and a cough.  Took cough medication this morning that did help to decrease his symptoms.  Symptoms worsening in the past day.  No known sick contacts.  Has not had Covid vaccination.  Smoker.  Denies fatigue, nausea, vomiting, diarrhea, and shortness of breath.    MDM:NHT. Lungs CTA with crackles in bilateral bases    Plan: Tessalon Perles, albuterol inhaler, and azithromycin Z-Rafa.  Education provided and/or discussed for this/these medications and for bronchitis.  Treat symptoms conservatively with acetaminophen and  ibuprofen (if applicable) for fevers, body aches, and headaches, guaifenesin and/or honey for cough. May use chest rubs for sore throat and congestion, hot and cold liquids may help decrease sore throat and help you feel better. Increase fluids. You may utilize pseudoephedrine for congestion. Return to be reevaluated by ER/UC or your primary care provider if symptoms worsen, you develop breathing difficulties, or you do not improve in a reasonable time frame. It can take several days for a cough to resolve. It can take ten to fourteen days for upper respiratory symptoms to resolve.   These discharge instructions and medications were reviewed with him and his wife and understanding verbalized.    This document was prepared using a combination of typing and voice generated software.  While every attempt was made for accuracy, spelling and grammatical errors may exist.    Discharge Medication List as of 1/24/2022 10:53 AM      START taking these medications    Details   !! albuterol (PROAIR HFA) 108 (90 Base) MCG/ACT inhaler Inhale 2 puffs into the lungs every 4 hours as needed for shortness of breath / dyspnea, Disp-8.5 g, R-0, E-Prescribe      azithromycin (ZITHROMAX) 250 MG tablet Take 2 tablets (500 mg) by mouth daily for 1 day, THEN 1 tablet (250 mg) daily for 4 days., Disp-6 tablet, R-0, E-Prescribe      !! benzonatate (TESSALON) 100 MG capsule Take 2 capsules (200  mg) by mouth 3 times daily as needed for cough, Disp-12 capsule, R-0, E-Prescribe      spacer (OPTICHAMBER CANDICE) holding chamber Use with inhaler, Disp-1 each, R-0, E-Prescribe       !! - Potential duplicate medications found. Please discuss with provider.          Final diagnoses:   Bronchitis       1/24/2022   [facility]       Aidee Zhou, CNP  01/24/22 8041

## 2022-02-15 ENCOUNTER — HOSPITAL ENCOUNTER (EMERGENCY)
Facility: HOSPITAL | Age: 37
Discharge: LEFT BEFORE DISCHARGE | End: 2022-02-15
Admitting: EMERGENCY MEDICINE
Payer: COMMERCIAL

## 2022-02-15 ENCOUNTER — NURSE TRIAGE (OUTPATIENT)
Dept: FAMILY MEDICINE | Facility: OTHER | Age: 37
End: 2022-02-15
Payer: COMMERCIAL

## 2022-02-15 VITALS
OXYGEN SATURATION: 99 % | RESPIRATION RATE: 16 BRPM | SYSTOLIC BLOOD PRESSURE: 134 MMHG | DIASTOLIC BLOOD PRESSURE: 96 MMHG | TEMPERATURE: 97 F | HEART RATE: 105 BPM

## 2022-02-15 LAB
ANION GAP SERPL CALCULATED.3IONS-SCNC: 7 MMOL/L (ref 3–14)
BASOPHILS # BLD AUTO: 0 10E3/UL (ref 0–0.2)
BASOPHILS NFR BLD AUTO: 0 %
BUN SERPL-MCNC: 7 MG/DL (ref 7–30)
CALCIUM SERPL-MCNC: 9.1 MG/DL (ref 8.5–10.1)
CHLORIDE BLD-SCNC: 106 MMOL/L (ref 94–109)
CO2 SERPL-SCNC: 24 MMOL/L (ref 20–32)
CREAT SERPL-MCNC: 0.65 MG/DL (ref 0.66–1.25)
EOSINOPHIL # BLD AUTO: 0.2 10E3/UL (ref 0–0.7)
EOSINOPHIL NFR BLD AUTO: 2 %
ERYTHROCYTE [DISTWIDTH] IN BLOOD BY AUTOMATED COUNT: 12.5 % (ref 10–15)
GFR SERPL CREATININE-BSD FRML MDRD: >90 ML/MIN/1.73M2
GLUCOSE BLD-MCNC: 282 MG/DL (ref 70–99)
HCT VFR BLD AUTO: 46.9 % (ref 40–53)
HGB BLD-MCNC: 15.8 G/DL (ref 13.3–17.7)
HOLD SPECIMEN: NORMAL
IMM GRANULOCYTES # BLD: 0 10E3/UL
IMM GRANULOCYTES NFR BLD: 0 %
LYMPHOCYTES # BLD AUTO: 2.2 10E3/UL (ref 0.8–5.3)
LYMPHOCYTES NFR BLD AUTO: 28 %
MCH RBC QN AUTO: 28.2 PG (ref 26.5–33)
MCHC RBC AUTO-ENTMCNC: 33.7 G/DL (ref 31.5–36.5)
MCV RBC AUTO: 84 FL (ref 78–100)
MONOCYTES # BLD AUTO: 0.6 10E3/UL (ref 0–1.3)
MONOCYTES NFR BLD AUTO: 7 %
NEUTROPHILS # BLD AUTO: 4.8 10E3/UL (ref 1.6–8.3)
NEUTROPHILS NFR BLD AUTO: 63 %
NRBC # BLD AUTO: 0 10E3/UL
NRBC BLD AUTO-RTO: 0 /100
PLATELET # BLD AUTO: 288 10E3/UL (ref 150–450)
POTASSIUM BLD-SCNC: 4.2 MMOL/L (ref 3.4–5.3)
RBC # BLD AUTO: 5.6 10E6/UL (ref 4.4–5.9)
SODIUM SERPL-SCNC: 137 MMOL/L (ref 133–144)
WBC # BLD AUTO: 7.7 10E3/UL (ref 4–11)

## 2022-02-15 PROCEDURE — 999N000104 HC STATISTIC NO CHARGE

## 2022-02-15 PROCEDURE — 999N000000 HC CHARGE NOT FOUND

## 2022-02-15 PROCEDURE — 36415 COLL VENOUS BLD VENIPUNCTURE: CPT | Performed by: EMERGENCY MEDICINE

## 2022-02-15 PROCEDURE — 85025 COMPLETE CBC W/AUTO DIFF WBC: CPT | Performed by: EMERGENCY MEDICINE

## 2022-02-15 PROCEDURE — 82310 ASSAY OF CALCIUM: CPT | Performed by: EMERGENCY MEDICINE

## 2022-02-15 NOTE — TELEPHONE ENCOUNTER
Pt here with cough and diarrhea. Cough started yesterday and diarrhea Friday.He wants an appt. Offered covid testing. Spoke with  and he will see pt but it will be a wait. Pt states he cannot wait. Advised UC.He verbalized understanding.    Kimberly Van, RN      Additional Information    Negative: SEVERE difficulty breathing (e.g., struggling for each breath, speaks in single words)    Negative: Difficult to awaken or acting confused (e.g., disoriented, slurred speech)    Negative: Bluish (or gray) lips or face now    Negative: Shock suspected (e.g., cold/pale/clammy skin, too weak to stand, low BP, rapid pulse)    Negative: Sounds like a life-threatening emergency to the triager    Negative: [1] COVID-19 exposure AND [2] no symptoms    Negative: COVID-19 vaccine reaction suspected (e.g., fever, headache, muscle aches) occurring 1 to 3 days after getting vaccine    Negative: COVID-19 vaccine, questions about    Negative: [1] Lives with someone known to have influenza (flu test positive) AND [2] flu-like symptoms (e.g., cough, runny nose, sore throat, SOB; with or without fever)    Negative: [1] Adult with possible COVID-19 symptoms AND [2] triager concerned about severity of symptoms or other causes    Negative: COVID-19 and breastfeeding, questions about    Negative: SEVERE or constant chest pain or pressure (Exception: mild central chest pain, present only when coughing)    Negative: MODERATE difficulty breathing (e.g., speaks in phrases, SOB even at rest, pulse 100-120)    Negative: [1] Headache AND [2] stiff neck (can't touch chin to chest)    Negative: MILD difficulty breathing (e.g., minimal/no SOB at rest, SOB with walking, pulse <100)    Negative: Chest pain or pressure    Negative: Patient sounds very sick or weak to the triager    Negative: Fever > 103 F (39.4 C)    Negative: [1] Fever > 101 F (38.3 C) AND [2] age > 60 years    Negative: [1] Fever > 100.0 F (37.8 C) AND [2] bedridden (e.g.,  "nursing home patient, CVA, chronic illness, recovering from surgery)    Negative: HIGH RISK for severe COVID complications (e.g., age > 64 years, obesity with BMI > 25, pregnant, chronic lung disease or other chronic medical condition)  (Exception: Already seen by PCP and no new or worsening symptoms.)    Negative: [1] HIGH RISK patient AND [2] influenza is widespread in the community AND [3] ONE OR MORE respiratory symptoms: cough, sore throat, runny or stuffy nose    Negative: [1] HIGH RISK patient AND [2] influenza exposure within the last 7 days AND [3] ONE OR MORE respiratory symptoms: cough, sore throat, runny or stuffy nose    Negative: [1] COVID-19 infection suspected by caller or triager AND [2] mild symptoms (cough, fever, or others) AND [3] negative COVID-19 rapid test    Negative: Fever present > 3 days (72 hours)    Negative: [1] Fever returns after gone for over 24 hours AND [2] symptoms worse or not improved    Negative: [1] Continuous (nonstop) coughing interferes with work or school AND [2] no improvement using cough treatment per protocol    Negative: Cough present > 3 weeks    Answer Assessment - Initial Assessment Questions  1. COVID-19 DIAGNOSIS: \"Who made your Coronavirus (COVID-19) diagnosis?\" \"Was it confirmed by a positive lab test?\" If not diagnosed by a HCP, ask \"Are there lots of cases (community spread) where you live?\" (See public health department website, if unsure)      no  2. COVID-19 EXPOSURE: \"Was there any known exposure to COVID before the symptoms began?\" CDC Definition of close contact: within 6 feet (2 meters) for a total of 15 minutes or more over a 24-hour period.       no  3. ONSET: \"When did the COVID-19 symptoms start?\"       2.11.2022  4. WORST SYMPTOM: \"What is your worst symptom?\" (e.g., cough, fever, shortness of breath, muscle aches)      diarrhea  5. COUGH: \"Do you have a cough?\" If Yes, ask: \"How bad is the cough?\"        Yes not very bad   6. FEVER: \"Do you have " "a fever?\" If Yes, ask: \"What is your temperature, how was it measured, and when did it start?\"      no  7. RESPIRATORY STATUS: \"Describe your breathing?\" (e.g., shortness of breath, wheezing, unable to speak)       no  8. BETTER-SAME-WORSE: \"Are you getting better, staying the same or getting worse compared to yesterday?\"  If getting worse, ask, \"In what way?\"     same  9. HIGH RISK DISEASE: \"Do you have any chronic medical problems?\" (e.g., asthma, heart or lung disease, weak immune system, obesity, etc.)      no  10. PREGNANCY: \"Is there any chance you are pregnant?\" \"When was your last menstrual period?\"        na  11. OTHER SYMPTOMS: \"Do you have any other symptoms?\"  (e.g., chills, fatigue, headache, loss of smell or taste, muscle pain, sore throat; new loss of smell or taste especially support the diagnosis of COVID-19)        no    Protocols used: CORONAVIRUS (COVID-19) DIAGNOSED OR [id number] 8.25.2021      "

## 2022-03-19 ENCOUNTER — HEALTH MAINTENANCE LETTER (OUTPATIENT)
Age: 37
End: 2022-03-19

## 2022-03-24 ENCOUNTER — OFFICE VISIT (OUTPATIENT)
Dept: FAMILY MEDICINE | Facility: OTHER | Age: 37
End: 2022-03-24
Attending: NURSE PRACTITIONER
Payer: COMMERCIAL

## 2022-03-24 VITALS
BODY MASS INDEX: 32.29 KG/M2 | HEART RATE: 88 BPM | WEIGHT: 218 LBS | TEMPERATURE: 98.2 F | HEIGHT: 69 IN | SYSTOLIC BLOOD PRESSURE: 116 MMHG | OXYGEN SATURATION: 96 % | DIASTOLIC BLOOD PRESSURE: 64 MMHG | RESPIRATION RATE: 16 BRPM

## 2022-03-24 DIAGNOSIS — E11.9 TYPE 2 DIABETES MELLITUS WITHOUT COMPLICATION, WITHOUT LONG-TERM CURRENT USE OF INSULIN (H): ICD-10-CM

## 2022-03-24 DIAGNOSIS — R73.09 ELEVATED GLUCOSE: Primary | ICD-10-CM

## 2022-03-24 DIAGNOSIS — Z13.220 LIPID SCREENING: ICD-10-CM

## 2022-03-24 PROCEDURE — 82043 UR ALBUMIN QUANTITATIVE: CPT | Mod: ZL | Performed by: NURSE PRACTITIONER

## 2022-03-24 PROCEDURE — 83036 HEMOGLOBIN GLYCOSYLATED A1C: CPT | Mod: ZL | Performed by: NURSE PRACTITIONER

## 2022-03-24 PROCEDURE — 36415 COLL VENOUS BLD VENIPUNCTURE: CPT | Mod: ZL | Performed by: NURSE PRACTITIONER

## 2022-03-24 PROCEDURE — 80061 LIPID PANEL: CPT | Mod: ZL | Performed by: NURSE PRACTITIONER

## 2022-03-24 PROCEDURE — 80053 COMPREHEN METABOLIC PANEL: CPT | Mod: ZL | Performed by: NURSE PRACTITIONER

## 2022-03-24 PROCEDURE — G0463 HOSPITAL OUTPT CLINIC VISIT: HCPCS | Performed by: NURSE PRACTITIONER

## 2022-03-24 PROCEDURE — 99214 OFFICE O/P EST MOD 30 MIN: CPT | Performed by: NURSE PRACTITIONER

## 2022-03-24 ASSESSMENT — ENCOUNTER SYMPTOMS
VOMITING: 0
DIFFICULTY URINATING: 0
APNEA: 0
NEUROLOGICAL NEGATIVE: 1
CHILLS: 0
UNEXPECTED WEIGHT CHANGE: 1
CONSTIPATION: 0
PSYCHIATRIC NEGATIVE: 1
POLYDIPSIA: 1
FREQUENCY: 0
COUGH: 1
ACTIVITY CHANGE: 0
ENDOCRINE COMMENTS: SEE HPI
ABDOMINAL DISTENTION: 0
DIARRHEA: 0
APPETITE CHANGE: 0
FATIGUE: 0
FLANK PAIN: 0
HEMATOCHEZIA: 0
DIAPHORESIS: 0
FEVER: 0
DYSURIA: 0
SHORTNESS OF BREATH: 0
ALLERGIC/IMMUNOLOGIC NEGATIVE: 1
POLYPHAGIA: 0
NAUSEA: 0
HEARTBURN: 0
WHEEZING: 0
MUSCULOSKELETAL NEGATIVE: 1
ABDOMINAL PAIN: 0

## 2022-03-24 ASSESSMENT — PAIN SCALES - GENERAL: PAINLEVEL: NO PAIN (0)

## 2022-03-24 NOTE — PROGRESS NOTES
"  Assessment & Plan     Elevated glucose  - Hemoglobin A1c; Future  - Albumin Random Urine Quantitative with Creat Ratio; Future  - Comprehensive metabolic panel (BMP + Alb, Alk Phos, ALT, AST, Total. Bili, TP); Future  - Hemoglobin A1c  - Comprehensive metabolic panel (BMP + Alb, Alk Phos, ALT, AST, Total. Bili, TP)  - Albumin Random Urine Quantitative with Creat Ratio    Lipid screening  - Lipid Profile (Chol, Trig, HDL, LDL calc); Future  - Lipid Profile (Chol, Trig, HDL, LDL calc)      Post visit confirmed diagnosis of diabetes.   Type 2 diabetes mellitus without complication, without long-term current use of insulin (H)  - Diabetes Education Referral ([place])  - metFORMIN (GLUCOPHAGE) 500 MG tablet; Metformin start: Week 1:  One tablet (500mg) with breakfast Week 2:  One tablet (500mg) with breakfast and supper Week 3:  Two tablets (1000mg) with breakfast and one tablet (500mg) with supper Week 4:  Two tablets (1000mg) with breakfast and supper - please stay on this dose.    Review of prior external note(s) from - [facility] information from [facility] reviewed  Ordering of each unique test  No LOS data to display   Time spent doing chart review, history and exam, documentation and further activities per the note     Tobacco Cessation:   reports that he has been smoking. He started smoking about 23 years ago. He has been smoking about 0.50 packs per day. He has never used smokeless tobacco.  Tobacco Cessation Action Plan: Information offered: Patient not interested at this time    BMI:   Estimated body mass index is 32.19 kg/m  as calculated from the following:    Height as of this encounter: 1.753 m (5' 9\").    Weight as of this encounter: 98.9 kg (218 lb).   Weight management plan: Discussed healthy diet and exercise guidelines - referred for DM education which typically includes nutrition education as well pending insurance approval.      No follow-ups on file.    Pebbles Cunningham, CNP  [facility] " "[facility]    3/25/22: Diabetes confirmed. Metformin ordered as discussed as well as DM edu referral. Will wait on statin until adjusted to metformin.       Tay Banks is a 36 year old who presents for the following health issues  accompanied by his spouse and daughter.    HPI   Establish Care  Concerned about elevated blood sugar at 2/15/22 ER visit.  Blood glucose in  mg/dL on 2/15/22. At home 1 hour pre and post prandial have been around 160-180 with OTC monitor.     Foot care for diabetes education started and exam done. He is willing to see diabetes education for comprehensive services.     Review of Systems   Constitutional: Positive for unexpected weight change. Negative for activity change, appetite change, chills, diaphoresis, fatigue and fever.        Dropped from 230 to 218.    HENT: Negative.    Eyes: Positive for visual disturbance.        Eyes get blurry.    Respiratory: Positive for cough. Negative for apnea, shortness of breath and wheezing.         A few days of a cough-dry   Cardiovascular: Positive for chest pain.        Last few days.    Gastrointestinal: Negative for abdominal distention, abdominal pain, constipation, diarrhea, heartburn, hematochezia, nausea and vomiting.   Endocrine: Positive for polydipsia and polyuria. Negative for polyphagia.        See HPI   Genitourinary: Negative for decreased urine volume, difficulty urinating, dysuria, enuresis, flank pain and frequency.   Musculoskeletal: Negative.    Skin: Negative.    Allergic/Immunologic: Negative.    Neurological: Negative.    Psychiatric/Behavioral: Negative.             Objective    /64 (BP Location: Right arm, Patient Position: Sitting, Cuff Size: Adult Large)   Pulse 88   Temp 98.2  F (36.8  C) (Tympanic)   Resp 16   Ht 1.753 m (5' 9\")   Wt 98.9 kg (218 lb)   SpO2 96%   BMI 32.19 kg/m    Body mass index is 32.19 kg/m .     Physical Exam   GENERAL: healthy, alert and no distress  EYES: Eyes " grossly normal to inspection, PERRL and conjunctivae and sclerae normal  HENT: ear canals and TM's normal, nose and mouth without ulcers or lesions  NECK: no adenopathy, no asymmetry, masses, or scars and thyroid normal to palpation  RESP: lungs clear to auscultation - no rales, rhonchi or wheezes  CV: regular rate and rhythm, normal S1 S2, no S3 or S4, no murmur, click or rub, no peripheral edema and peripheral pulses strong  ABDOMEN: soft, nontender, no hepatosplenomegaly, no masses and bowel sounds normal  SKIN: no suspicious lesions or rashes  NEURO: Normal strength and tone, mentation intact and speech normal  BACK: no CVA tenderness, no paralumbar tenderness  PSYCH: mentation appears normal, affect normal/bright  Diabetic foot exam: normal DP and PT pulses, no trophic changes or ulcerative lesions, normal sensory exam and normal monofilament exam    Results for orders placed or performed in visit on 03/24/22   Hemoglobin A1c     Status: Abnormal   Result Value Ref Range    Estimated Average Glucose 180 mg/dL    Hemoglobin A1C 7.9 (H) 0.0 - 5.6 %   Lipid Profile (Chol, Trig, HDL, LDL calc)     Status: Abnormal   Result Value Ref Range    Cholesterol 235 (H) <200 mg/dL    Triglycerides 181 (H) <150 mg/dL    Direct Measure HDL 35 (L) >=40 mg/dL    LDL Cholesterol Calculated 164 (H) <=100 mg/dL    Non HDL Cholesterol 200 (H) <130 mg/dL    Patient Fasting > 8hrs? No     Narrative    Cholesterol  Desirable:  <200 mg/dL    Triglycerides  Normal:  Less than 150 mg/dL  Borderline High:  150-199 mg/dL  High:  200-499 mg/dL  Very High:  Greater than or equal to 500 mg/dL    Direct Measure HDL  Female:  Greater than or equal to 50 mg/dL   Male:  Greater than or equal to 40 mg/dL    LDL Cholesterol  Desirable:  <100mg/dL  Above Desirable:  100-129 mg/dL   Borderline High:  130-159 mg/dL   High:  160-189 mg/dL   Very High:  >= 190 mg/dL    Non HDL Cholesterol  Desirable:  130 mg/dL  Above Desirable:  130-159  mg/dL  Borderline High:  160-189 mg/dL  High:  190-219 mg/dL  Very High:  Greater than or equal to 220 mg/dL   Comprehensive metabolic panel (BMP + Alb, Alk Phos, ALT, AST, Total. Bili, TP)     Status: Abnormal   Result Value Ref Range    Sodium 140 133 - 144 mmol/L    Potassium 3.8 3.4 - 5.3 mmol/L    Chloride 109 94 - 109 mmol/L    Carbon Dioxide (CO2) 26 20 - 32 mmol/L    Anion Gap 5 3 - 14 mmol/L    Urea Nitrogen 10 7 - 30 mg/dL    Creatinine 0.83 0.66 - 1.25 mg/dL    Calcium 9.2 8.5 - 10.1 mg/dL    Glucose 104 (H) 70 - 99 mg/dL    Alkaline Phosphatase 63 40 - 150 U/L    AST 21 0 - 45 U/L    ALT 50 0 - 70 U/L    Protein Total 7.4 6.8 - 8.8 g/dL    Albumin 4.0 3.4 - 5.0 g/dL    Bilirubin Total 0.6 0.2 - 1.3 mg/dL    GFR Estimate >90 >60 mL/min/1.73m2   Albumin Random Urine Quantitative with Creat Ratio     Status: None   Result Value Ref Range    Creatinine Urine mg/dL 149 mg/dL    Albumin Urine mg/L 8 mg/L    Albumin Urine mg/g Cr 5.37 0.00 - 17.00 mg/g Cr

## 2022-03-24 NOTE — NURSING NOTE
"Chief Complaint   Patient presents with     Establish Care       Initial /64 (BP Location: Right arm, Patient Position: Sitting, Cuff Size: Adult Large)   Pulse 88   Temp 98.2  F (36.8  C) (Tympanic)   Resp 16   Ht 1.753 m (5' 9\")   Wt 98.9 kg (218 lb)   SpO2 96%   BMI 32.19 kg/m   Estimated body mass index is 32.19 kg/m  as calculated from the following:    Height as of this encounter: 1.753 m (5' 9\").    Weight as of this encounter: 98.9 kg (218 lb).  Medication Reconciliation: complete  Yudelka Damon LPN    "

## 2022-03-25 PROBLEM — E11.9 DIABETES MELLITUS, TYPE 2 (H): Status: ACTIVE | Noted: 2022-03-25

## 2022-03-25 LAB
ALBUMIN SERPL-MCNC: 4 G/DL (ref 3.4–5)
ALP SERPL-CCNC: 63 U/L (ref 40–150)
ALT SERPL W P-5'-P-CCNC: 50 U/L (ref 0–70)
ANION GAP SERPL CALCULATED.3IONS-SCNC: 5 MMOL/L (ref 3–14)
AST SERPL W P-5'-P-CCNC: 21 U/L (ref 0–45)
BILIRUB SERPL-MCNC: 0.6 MG/DL (ref 0.2–1.3)
BUN SERPL-MCNC: 10 MG/DL (ref 7–30)
CALCIUM SERPL-MCNC: 9.2 MG/DL (ref 8.5–10.1)
CHLORIDE BLD-SCNC: 109 MMOL/L (ref 94–109)
CHOLEST SERPL-MCNC: 235 MG/DL
CO2 SERPL-SCNC: 26 MMOL/L (ref 20–32)
CREAT SERPL-MCNC: 0.83 MG/DL (ref 0.66–1.25)
CREAT UR-MCNC: 149 MG/DL
EST. AVERAGE GLUCOSE BLD GHB EST-MCNC: 180 MG/DL
FASTING STATUS PATIENT QL REPORTED: NO
GFR SERPL CREATININE-BSD FRML MDRD: >90 ML/MIN/1.73M2
GLUCOSE BLD-MCNC: 104 MG/DL (ref 70–99)
HBA1C MFR BLD: 7.9 % (ref 0–5.6)
HDLC SERPL-MCNC: 35 MG/DL
LDLC SERPL CALC-MCNC: 164 MG/DL
MICROALBUMIN UR-MCNC: 8 MG/L
MICROALBUMIN/CREAT UR: 5.37 MG/G CR (ref 0–17)
NONHDLC SERPL-MCNC: 200 MG/DL
POTASSIUM BLD-SCNC: 3.8 MMOL/L (ref 3.4–5.3)
PROT SERPL-MCNC: 7.4 G/DL (ref 6.8–8.8)
SODIUM SERPL-SCNC: 140 MMOL/L (ref 133–144)
TRIGL SERPL-MCNC: 181 MG/DL

## 2022-03-25 NOTE — RESULT ENCOUNTER NOTE
A1c elevated. Lipids elevated. Will start Metformin as discussed. Will need to start a statin in near future but will get adjusted to metformin first.

## 2022-04-26 ENCOUNTER — OFFICE VISIT (OUTPATIENT)
Dept: FAMILY MEDICINE | Facility: OTHER | Age: 37
End: 2022-04-26
Attending: NURSE PRACTITIONER
Payer: COMMERCIAL

## 2022-04-26 VITALS
BODY MASS INDEX: 32.44 KG/M2 | DIASTOLIC BLOOD PRESSURE: 62 MMHG | OXYGEN SATURATION: 98 % | HEIGHT: 69 IN | TEMPERATURE: 97.2 F | HEART RATE: 102 BPM | RESPIRATION RATE: 16 BRPM | SYSTOLIC BLOOD PRESSURE: 104 MMHG | WEIGHT: 219 LBS

## 2022-04-26 DIAGNOSIS — K59.00 CONSTIPATION, UNSPECIFIED CONSTIPATION TYPE: ICD-10-CM

## 2022-04-26 DIAGNOSIS — E11.9 TYPE 2 DIABETES MELLITUS WITHOUT COMPLICATION, WITHOUT LONG-TERM CURRENT USE OF INSULIN (H): Primary | ICD-10-CM

## 2022-04-26 PROCEDURE — G0463 HOSPITAL OUTPT CLINIC VISIT: HCPCS | Performed by: NURSE PRACTITIONER

## 2022-04-26 PROCEDURE — 99214 OFFICE O/P EST MOD 30 MIN: CPT | Performed by: NURSE PRACTITIONER

## 2022-04-26 RX ORDER — METFORMIN HCL 500 MG
TABLET, EXTENDED RELEASE 24 HR ORAL
COMMUNITY
End: 2022-04-26

## 2022-04-26 RX ORDER — ROSUVASTATIN CALCIUM 5 MG/1
5 TABLET, COATED ORAL DAILY
Qty: 90 TABLET | Refills: 3 | Status: SHIPPED | OUTPATIENT
Start: 2022-04-26 | End: 2023-01-01

## 2022-04-26 RX ORDER — DOCUSATE SODIUM 100 MG/1
100 CAPSULE, LIQUID FILLED ORAL 3 TIMES DAILY PRN
Qty: 180 CAPSULE | Refills: 1 | Status: SHIPPED | OUTPATIENT
Start: 2022-04-26 | End: 2022-09-16

## 2022-04-26 RX ORDER — ASPIRIN 81 MG/1
81 TABLET, CHEWABLE ORAL
COMMUNITY

## 2022-04-26 ASSESSMENT — PAIN SCALES - GENERAL: PAINLEVEL: NO PAIN (0)

## 2022-04-26 NOTE — PROGRESS NOTES
"  Assessment & Plan     Type 2 diabetes mellitus without complication, without long-term current use of insulin (H)  Come in for lab only A1C and then virtual visit soon after that if A1C is over 7.  If below 7, we will follow up in 6 months.     - Hemoglobin A1c; Future  - rosuvastatin (CRESTOR) 5 MG tablet; Take 1 tablet (5 mg) by mouth daily    Constipation, unspecified constipation type  If colace is not helping, you may take occasional senna. Both are over the counter.   - docusate sodium (COLACE) 100 MG capsule; Take 1 capsule (100 mg) by mouth 3 times daily as needed for constipation    Ordering of each unique test  No LOS data to display   Time spent doing chart review, history and exam, documentation and further activities per the note       Return in about 6 months (around 10/26/2022).    Pebbles Cunningham, [facility] - TING Banks is a 36 year old who presents for the following health issues  accompanied by his spouse and daughter.    HPI     Medication Followup of metformin     Taking Medication as prescribed: yes    Side Effects:  constipation    Medication Helping Symptoms:  Not currently checking blood sugars    Constipation  Since starting metformin has had some constipation. Having stools every 3-4 days. Requests stool softener.        Review of Systems   Constitutional, HEENT, cardiovascular, pulmonary, gi and gu systems are negative, except as otherwise noted.      Objective    /62 (BP Location: Right arm, Patient Position: Sitting, Cuff Size: Adult Large)   Pulse 102   Temp 97.2  F (36.2  C) (Tympanic)   Resp 16   Ht 1.753 m (5' 9\")   Wt 99.3 kg (219 lb)   SpO2 98%   BMI 32.34 kg/m    Body mass index is 32.34 kg/m .     Physical Exam   GENERAL: healthy, alert and no distress  EYES: Eyes grossly normal to inspection, PERRL and conjunctivae and sclerae normal  NECK: no adenopathy, no asymmetry, masses, or scars and thyroid normal to palpation  RESP: " lungs clear to auscultation - no rales, rhonchi or wheezes  CV: regular rate and rhythm, normal S1 S2, no S3 or S4, no murmur, click or rub, no peripheral edema and peripheral pulses strong  SKIN: no suspicious lesions or rashes  NEURO: Normal strength and tone, mentation intact and speech normal  PSYCH: mentation appears normal, affect normal/bright    No results found for any visits on 04/26/22.

## 2022-04-26 NOTE — PATIENT INSTRUCTIONS
Come in for lab only A1C and then virtual visit soon after that if A1C is over 7.  If below 7, we will follow up in 6 months.     If colace is not helping, you may take occasional senna. Both are over the counter.

## 2022-04-26 NOTE — NURSING NOTE
"Chief Complaint   Patient presents with     RECHECK     Blood sugar       Initial /62 (BP Location: Right arm, Patient Position: Sitting, Cuff Size: Adult Large)   Pulse 102   Temp 97.2  F (36.2  C) (Tympanic)   Resp 16   Ht 1.753 m (5' 9\")   Wt 99.3 kg (219 lb)   SpO2 98%   BMI 32.34 kg/m   Estimated body mass index is 32.34 kg/m  as calculated from the following:    Height as of this encounter: 1.753 m (5' 9\").    Weight as of this encounter: 99.3 kg (219 lb).  Medication Reconciliation: complete  Yudelka Damon LPN    "

## 2022-05-13 DIAGNOSIS — E11.9 TYPE 2 DIABETES MELLITUS WITHOUT COMPLICATION, WITHOUT LONG-TERM CURRENT USE OF INSULIN (H): ICD-10-CM

## 2022-06-28 ENCOUNTER — OFFICE VISIT (OUTPATIENT)
Dept: FAMILY MEDICINE | Facility: OTHER | Age: 37
End: 2022-06-28
Attending: NURSE PRACTITIONER
Payer: COMMERCIAL

## 2022-06-28 VITALS
SYSTOLIC BLOOD PRESSURE: 110 MMHG | OXYGEN SATURATION: 98 % | HEART RATE: 92 BPM | TEMPERATURE: 97.8 F | BODY MASS INDEX: 32.44 KG/M2 | HEIGHT: 69 IN | WEIGHT: 219 LBS | DIASTOLIC BLOOD PRESSURE: 62 MMHG | RESPIRATION RATE: 16 BRPM

## 2022-06-28 DIAGNOSIS — L98.9 SKIN LESION OF FOOT: Primary | ICD-10-CM

## 2022-06-28 DIAGNOSIS — E11.9 TYPE 2 DIABETES MELLITUS WITHOUT COMPLICATION, WITHOUT LONG-TERM CURRENT USE OF INSULIN (H): ICD-10-CM

## 2022-06-28 LAB
EST. AVERAGE GLUCOSE BLD GHB EST-MCNC: 134 MG/DL
HBA1C MFR BLD: 6.3 % (ref 0–5.6)

## 2022-06-28 PROCEDURE — 99212 OFFICE O/P EST SF 10 MIN: CPT | Performed by: NURSE PRACTITIONER

## 2022-06-28 PROCEDURE — 36415 COLL VENOUS BLD VENIPUNCTURE: CPT | Mod: ZL | Performed by: NURSE PRACTITIONER

## 2022-06-28 PROCEDURE — G0463 HOSPITAL OUTPT CLINIC VISIT: HCPCS | Performed by: NURSE PRACTITIONER

## 2022-06-28 PROCEDURE — 83036 HEMOGLOBIN GLYCOSYLATED A1C: CPT | Mod: ZL | Performed by: NURSE PRACTITIONER

## 2022-06-28 ASSESSMENT — PAIN SCALES - GENERAL: PAINLEVEL: NO PAIN (0)

## 2022-06-28 NOTE — PATIENT INSTRUCTIONS
Start using a bunion pad to the right foot. Daily lotion of your feet but avoid between toes.  Follow up with podiatry.

## 2022-06-28 NOTE — NURSING NOTE
"Chief Complaint   Patient presents with     bump on foot       Initial /62 (BP Location: Right arm, Patient Position: Sitting, Cuff Size: Adult Large)   Pulse 92   Temp 97.8  F (36.6  C) (Tympanic)   Resp 16   Ht 1.753 m (5' 9\")   Wt 99.3 kg (219 lb)   SpO2 98%   BMI 32.34 kg/m   Estimated body mass index is 32.34 kg/m  as calculated from the following:    Height as of this encounter: 1.753 m (5' 9\").    Weight as of this encounter: 99.3 kg (219 lb).  Medication Reconciliation: complete  Yudelka Damon LPN    "

## 2022-06-28 NOTE — PROGRESS NOTES
"  Assessment & Plan     Skin lesion of foot  Advised to use bonion or corn cushion for pressure relief. Follow up with podiatry given diagnosis of diabetes. Stressed foot day daily at home. Lotion and inspect daily but do not put lotion between toes.   - Orthopedic  Referral; Future    Type 2 diabetes mellitus without complication, without long-term current use of insulin (H)  - Hemoglobin A1c    Ordering of each unique test  I spent a total of 12 minutes on the day of the visit.   Time spent doing chart review, history and exam, documentation and further activities per the note       Return if symptoms worsen or fail to improve.    Pebbles Cunningham, CNP  [facility] - TING Banks is a 36 year old{ presenting for the following health issues:  bump on foot      HPI     Concern - foot problem  Onset: ongoing  Description: lump on side of right foot  Intensity: moderate  Progression of Symptoms:  worsening  Accompanying Signs & Symptoms: pain when walking and putting on footwear  Previous history of similar problem: no  Precipitating factors:        Worsened by: pressure  Alleviating factors:        Improved by: none  Therapies tried and outcome: None    Diabetes: Diagnosed and started on metformin in March 2022. Has been taking metformin as ordered.       Review of Systems   Constitutional, HEENT, cardiovascular, pulmonary, gi and gu systems are negative, except as otherwise noted.      Objective    /62 (BP Location: Right arm, Patient Position: Sitting, Cuff Size: Adult Large)   Pulse 92   Temp 97.8  F (36.6  C) (Tympanic)   Resp 16   Ht 1.753 m (5' 9\")   Wt 99.3 kg (219 lb)   SpO2 98%   BMI 32.34 kg/m    Body mass index is 32.34 kg/m .     Physical Exam   GENERAL: healthy, alert and no distress  Diabetic foot exam: normal DP and PT pulses, normal sensory exam. Excoriation present with some break down in skin between toes. Small 0.5 cm raised area of hypertrophy to " lateral side of foot.     Results for orders placed or performed in visit on 06/28/22   Hemoglobin A1c     Status: Abnormal   Result Value Ref Range    Estimated Average Glucose 134 mg/dL    Hemoglobin A1C 6.3 (H) 0.0 - 5.6 %                 .  ..

## 2022-07-27 ENCOUNTER — OFFICE VISIT (OUTPATIENT)
Dept: PODIATRY | Facility: OTHER | Age: 37
End: 2022-07-27
Attending: NURSE PRACTITIONER
Payer: COMMERCIAL

## 2022-07-27 VITALS
OXYGEN SATURATION: 99 % | TEMPERATURE: 96.8 F | DIASTOLIC BLOOD PRESSURE: 83 MMHG | SYSTOLIC BLOOD PRESSURE: 121 MMHG | HEART RATE: 85 BPM

## 2022-07-27 DIAGNOSIS — F17.210 CIGARETTE NICOTINE DEPENDENCE WITHOUT COMPLICATION: ICD-10-CM

## 2022-07-27 DIAGNOSIS — E11.9 DIABETES MELLITUS TYPE 2, NONINSULIN DEPENDENT (H): ICD-10-CM

## 2022-07-27 DIAGNOSIS — Z71.6 TOBACCO ABUSE COUNSELING: ICD-10-CM

## 2022-07-27 DIAGNOSIS — L84 CALLUS OF FOOT: ICD-10-CM

## 2022-07-27 DIAGNOSIS — B35.3 TINEA PEDIS OF BOTH FEET: ICD-10-CM

## 2022-07-27 DIAGNOSIS — B07.0 PLANTAR VERRUCA: Primary | ICD-10-CM

## 2022-07-27 PROCEDURE — 17110 DESTRUCTION B9 LES UP TO 14: CPT | Performed by: PODIATRIST

## 2022-07-27 PROCEDURE — 99203 OFFICE O/P NEW LOW 30 MIN: CPT | Mod: 25 | Performed by: PODIATRIST

## 2022-07-27 PROCEDURE — G0463 HOSPITAL OUTPT CLINIC VISIT: HCPCS | Mod: 25

## 2022-07-27 RX ORDER — IMIQUIMOD 12.5 MG/.25G
CREAM TOPICAL
Qty: 12 PACKET | Refills: 3 | Status: SHIPPED | OUTPATIENT
Start: 2022-07-27 | End: 2023-01-06

## 2022-07-27 ASSESSMENT — PAIN SCALES - GENERAL: PAINLEVEL: MILD PAIN (2)

## 2022-07-27 NOTE — PROGRESS NOTES
Chief complaint: Patient presents with:  Musculoskeletal Problem: Skin lesion      History of Present Illness: This 36 year old NIDDM II male is seen at the request of Pebbles Cunningham NP, for evaluation and suggestions of management of a soft tissue lesion on the RIGHT lateral foot. It developed a around May, 2022. He suddenly felt it one day. He feels it in his water boot for work and he noticed it rubbing one day. Since it developed, the lesion has not changed in size. He gets burning around the lesions when it rubs in his shoe. He otherwise denies burning, tingling, and numbness in his feet.     Additionally, he gets flaking skin and a red, rash type of appearence on his feet (RIGHT more than LEFT). He has tried Carlos Lotion, but it doesn't help. 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    Patient smokes 1/2 ppd and he is not ready to quit at this time.       No further pedal complaints today.         /83 (BP Location: Left arm, Patient Position: Sitting, Cuff Size: Adult Regular)   Pulse 85   Temp 96.8  F (36  C) (Tympanic)   SpO2 99%     Patient Active Problem List   Diagnosis     Diabetes mellitus, type 2 (H)       History reviewed. No pertinent surgical history.    Current Outpatient Medications   Medication     acetaminophen (TYLENOL) 325 MG tablet     albuterol (PROAIR HFA) 108 (90 Base) MCG/ACT inhaler     aspirin (ASA) 81 MG chewable tablet     benzonatate (TESSALON) 100 MG capsule     docusate sodium (COLACE) 100 MG capsule     metFORMIN (GLUCOPHAGE) 500 MG tablet     rosuvastatin (CRESTOR) 5 MG tablet     spacer (OPTICHAMBER CANDICE) holding chamber     No current facility-administered medications for this visit.        No Known Allergies    Family History   Problem Relation Age of Onset     Substance Abuse Mother      No Known Problems Father      Diabetes Sister         During pregnancy     Unexplained death Brother         unknown cause in infancy     No Known Problems  Maternal Grandmother      No Known Problems Maternal Grandfather      No Known Problems Paternal Grandmother      No Known Problems Paternal Grandfather        Social History     Socioeconomic History     Marital status: Single     Spouse name: None     Number of children: None     Years of education: None     Highest education level: None   Tobacco Use     Smoking status: Current Every Day Smoker     Packs/day: 0.50     Start date: 1999     Smokeless tobacco: Never Used     Tobacco comment: pt denies quit plan 7/27/2022   Vaping Use     Vaping Use: Never used   Substance and Sexual Activity     Alcohol use: Yes     Comment: rare     Drug use: No       ROS: 10 point ROS neg other than the symptoms noted above in the HPI.  EXAM  Constitutional: healthy, alert and no distress    Psychiatric: mentation appears normal and affect normal/bright    VASCULAR:  -Dorsalis pedis pulse +2/4 b/l  -Posterior tibial pulse +2/4 b/l  -Capillary refill time < 3 seconds to b/l hallux  -Hair growth Present to b/l anterior legs and ankles  NEURO:  -Light touch sensation intact to b/l plantar forefoot  DERM:  -Skin temperature, texture and turgor WNL b/l  -Soft tissue mass located on the RIGHT lateral fifth metatarsal head  ---Mass is consistent with verrucae appearance measuring 0.3cm x 0.3cm x 0.1cm  ---Overlying hyperkeratotic lesion   ---Punctate black spots within mass with punctate bleeding post debridement  ---Skin lines were not retained post debridement of lesion  -Skin mild-to-moderately erythematous with flaking of skin to bilateral plantar feet in a moccasin distribution    MSK:  -Pain on palpation to the RIGHT lateral fifth metatarsal head soft tissue mass   -Muscle strength of ankles +5/5 for dorsiflexion, plantarflexion, ABDUction and ADDuction b/l  ============================================================    ASSESSMENT:  (B07.0) Plantar verruca  (primary encounter diagnosis)    (L84) Callus of foot    (B35.3) Tinea  pedis of both feet    (E11.9) Diabetes mellitus type 2, noninsulin dependent (H)    (Z71.6) Tobacco abuse counseling    (F17.210) Cigarette nicotine dependence without complication        PLAN:  -Patient evaluated and examined. Treatment options discussed with no educational barriers noted.    -Discussed Athlete's Foot and treatment regimens. Treatment plan:  ---Apply an anti-fungal foot cream to the feet every morning and evening  ---Apply an anti-fungal foot powder to the feet daily prior to applying white, cotton socks  ---Switch out socks with a fresh powder retirement through the day if socks are moist  ---Spray an anti-fungal spray or Lysol spray to shoes daily   ---Spray tub/shower with a bleach based shower  daily after use    -------------------------------    Plantar warts  -Discussed etiology and treatment options for plantar warts. Plantar warts can be very persistent and challenging to treat since feet are usually in shoes every daily (a dark, warm, moist environment which is a thriving environment for plantar warts). Plantar warts can come back after being treated. There are multiple ways to treat plantar warts. Treatment today is advised to start with paring the wart down to the punctate bleeding, freezing the wart with liquid nitrogen, then applying Cantharidin. The patient will be prescribed Imiquimod which is to be used on M-W-F under occlusion. This can be done weekly until the wart is fully gone. Another option is to inject local for numbness and do an aggressive debridement of the wart in the clinic in attempt to treat the wart more quickly. This would also be in conjunction with liquid nitrogen and Cantharidin. The risks of this is scarring of the skin, multiple days or weeks of wound care to get the wound to heel, and pain at the treatment site. After reviewing risks and benefits, patient has decided to proceed with conservative treatment at this time. He would like to avoid an excision if  his feet are in water all day at work.  ----Patient was instructed to look for signs of infection (redness, swelling, pain, purulence, fever, chills, nausea, vomiting) and to return to podiatry or the emergency department immediately if there are any signs of infection.     Wart treatment:  -Informed consent obtained for destruction of plantar lesion x 1 wart(s) located on RIGHT lateral foot:  ---A time out was performed to identify the correct patient, procedure and laterality    ---Pared callus to punctate bleeding  ---Applied liquid nitrogen to tolerance (approximately 2 seconds per application) x 4 applications  ---Applied a dressing over the wart site with gauze and Medipore tape  ----Patient was instructed to look for SOI (redness, swelling, pain, purulence, fever, chills, nausea, vomiting) and to return to podiatry or the emergency department immediately if there are any SOI.    -Imiquimod ordered with three refills on 07/27/2022. Patient is to use this three times weekly    -Tobacco cessation discussed with patient including the benefits of quitting and the risks of not quitting. The Quit Plan referral was offered and the patient is not interested in the referral today. Patient is not interested in quitting today.    -Patient in agreement with the above treatment plan and all of patient's questions were answered.      RTC weekly for the next four weeks ([place] when possible)        Loraine Elaine DPM, REVA

## 2022-07-27 NOTE — NURSING NOTE
"Chief Complaint   Patient presents with     Musculoskeletal Problem     Skin lesion       Initial /83 (BP Location: Left arm, Patient Position: Sitting, Cuff Size: Adult Regular)   Pulse 85   Temp 96.8  F (36  C) (Tympanic)   SpO2 99%  Estimated body mass index is 32.34 kg/m  as calculated from the following:    Height as of 6/28/22: 1.753 m (5' 9\").    Weight as of 6/28/22: 99.3 kg (219 lb).  Medication Reconciliation: yared Puckett  "

## 2022-07-27 NOTE — LETTER
7/27/2022         RE: Darren Magallon  [address] [place] [address]        Dear Colleague,    Thank you for referring your patient, Darren Magallon, to the [facility]. Please see a copy of my visit note below.    Chief complaint: Patient presents with:  Musculoskeletal Problem: Skin lesion      History of Present Illness: This 36 year old NIDDM II male is seen at the request of Pebbles Cunningham NP, for evaluation and suggestions of management of a soft tissue lesion on the RIGHT lateral foot. It developed a around May, 2022. He suddenly felt it one day. He feels it in his water boot for work and he noticed it rubbing one day. Since it developed, the lesion has not changed in size. He gets burning around the lesions when it rubs in his shoe. He otherwise denies burning, tingling, and numbness in his feet.     Additionally, he gets flaking skin and a red, rash type of appearence on his feet (RIGHT more than LEFT). He has tried Carlos Lotion, but it doesn't help. 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    Patient smokes 1/2 ppd and he is not ready to quit at this time.       No further pedal complaints today.         /83 (BP Location: Left arm, Patient Position: Sitting, Cuff Size: Adult Regular)   Pulse 85   Temp 96.8  F (36  C) (Tympanic)   SpO2 99%     Patient Active Problem List   Diagnosis     Diabetes mellitus, type 2 (H)       History reviewed. No pertinent surgical history.    Current Outpatient Medications   Medication     acetaminophen (TYLENOL) 325 MG tablet     albuterol (PROAIR HFA) 108 (90 Base) MCG/ACT inhaler     aspirin (ASA) 81 MG chewable tablet     benzonatate (TESSALON) 100 MG capsule     docusate sodium (COLACE) 100 MG capsule     metFORMIN (GLUCOPHAGE) 500 MG tablet     rosuvastatin (CRESTOR) 5 MG tablet     spacer ([facility]) holding chamber     No current facility-administered medications for this visit.        No Known  Allergies    Family History   Problem Relation Age of Onset     Substance Abuse Mother      No Known Problems Father      Diabetes Sister         During pregnancy     Unexplained death Brother         unknown cause in infancy     No Known Problems Maternal Grandmother      No Known Problems Maternal Grandfather      No Known Problems Paternal Grandmother      No Known Problems Paternal Grandfather        Social History     Socioeconomic History     Marital status: Single     Spouse name: None     Number of children: None     Years of education: None     Highest education level: None   Tobacco Use     Smoking status: Current Every Day Smoker     Packs/day: 0.50     Start date: 1999     Smokeless tobacco: Never Used     Tobacco comment: pt denies quit plan 7/27/2022   Vaping Use     Vaping Use: Never used   Substance and Sexual Activity     Alcohol use: Yes     Comment: rare     Drug use: No       ROS: 10 point ROS neg other than the symptoms noted above in the HPI.  EXAM  Constitutional: healthy, alert and no distress    Psychiatric: mentation appears normal and affect normal/bright    VASCULAR:  -Dorsalis pedis pulse +2/4 b/l  -Posterior tibial pulse +2/4 b/l  -Capillary refill time < 3 seconds to b/l hallux  -Hair growth Present to b/l anterior legs and ankles  NEURO:  -Light touch sensation intact to b/l plantar forefoot  DERM:  -Skin temperature, texture and turgor WNL b/l  -Soft tissue mass located on the RIGHT lateral fifth metatarsal head  ---Mass is consistent with verrucae appearance measuring 0.3cm x 0.3cm x 0.1cm  ---Overlying hyperkeratotic lesion   ---Punctate black spots within mass with punctate bleeding post debridement  ---Skin lines were not retained post debridement of lesion  -Skin mild-to-moderately erythematous with flaking of skin to bilateral plantar feet in a moccasin distribution    MSK:  -Pain on palpation to the RIGHT lateral fifth metatarsal head soft tissue mass   -Muscle strength of  ankles +5/5 for dorsiflexion, plantarflexion, ABDUction and ADDuction b/l  ============================================================    ASSESSMENT:  (B07.0) Plantar verruca  (primary encounter diagnosis)    (L84) Callus of foot    (B35.3) Tinea pedis of both feet    (E11.9) Diabetes mellitus type 2, noninsulin dependent (H)    (Z71.6) Tobacco abuse counseling    (F17.210) Cigarette nicotine dependence without complication        PLAN:  -Patient evaluated and examined. Treatment options discussed with no educational barriers noted.    -Discussed Athlete's Foot and treatment regimens. Treatment plan:  ---Apply an anti-fungal foot cream to the feet every morning and evening  ---Apply an anti-fungal foot powder to the feet daily prior to applying white, cotton socks  ---Switch out socks with a fresh powder shelter through the day if socks are moist  ---Spray an anti-fungal spray or Lysol spray to shoes daily   ---Spray tub/shower with a bleach based shower  daily after use    -------------------------------    Plantar warts  -Discussed etiology and treatment options for plantar warts. Plantar warts can be very persistent and challenging to treat since feet are usually in shoes every daily (a dark, warm, moist environment which is a thriving environment for plantar warts). Plantar warts can come back after being treated. There are multiple ways to treat plantar warts. Treatment today is advised to start with paring the wart down to the punctate bleeding, freezing the wart with liquid nitrogen, then applying Cantharidin. The patient will be prescribed Imiquimod which is to be used on M-W-F under occlusion. This can be done weekly until the wart is fully gone. Another option is to inject local for numbness and do an aggressive debridement of the wart in the clinic in attempt to treat the wart more quickly. This would also be in conjunction with liquid nitrogen and Cantharidin. The risks of this is scarring of the  skin, multiple days or weeks of wound care to get the wound to heel, and pain at the treatment site. After reviewing risks and benefits, patient has decided to proceed with conservative treatment at this time. He would like to avoid an excision if his feet are in water all day at work.  ----Patient was instructed to look for signs of infection (redness, swelling, pain, purulence, fever, chills, nausea, vomiting) and to return to podiatry or the emergency department immediately if there are any signs of infection.     Wart treatment:  -Informed consent obtained for destruction of plantar lesion x 1 wart(s) located on RIGHT lateral foot:  ---A time out was performed to identify the correct patient, procedure and laterality    ---Pared callus to punctate bleeding  ---Applied liquid nitrogen to tolerance (approximately 2 seconds per application) x 4 applications  ---Applied a dressing over the wart site with gauze and Medipore tape  ----Patient was instructed to look for SOI (redness, swelling, pain, purulence, fever, chills, nausea, vomiting) and to return to podiatry or the emergency department immediately if there are any SOI.    -Imiquimod ordered with three refills on 07/27/2022. Patient is to use this three times weekly    -Tobacco cessation discussed with patient including the benefits of quitting and the risks of not quitting. The Quit Plan referral was offered and the patient is not interested in the referral today. Patient is not interested in quitting today.    -Patient in agreement with the above treatment plan and all of patient's questions were answered.      RTC weekly for the next four weeks ([place] when possible)        Loraine Elaine DPM, REVA      Again, thank you for allowing me to participate in the care of your patient.        Sincerely,        Loraine Elaine DPM

## 2022-08-01 ENCOUNTER — APPOINTMENT (OUTPATIENT)
Dept: LAB | Facility: OTHER | Age: 37
End: 2022-08-01
Payer: COMMERCIAL

## 2022-08-03 ENCOUNTER — OFFICE VISIT (OUTPATIENT)
Dept: PODIATRY | Facility: OTHER | Age: 37
End: 2022-08-03
Attending: PODIATRIST
Payer: COMMERCIAL

## 2022-08-03 VITALS
OXYGEN SATURATION: 97 % | HEART RATE: 90 BPM | TEMPERATURE: 96.8 F | SYSTOLIC BLOOD PRESSURE: 110 MMHG | DIASTOLIC BLOOD PRESSURE: 70 MMHG

## 2022-08-03 DIAGNOSIS — B35.3 TINEA PEDIS OF BOTH FEET: ICD-10-CM

## 2022-08-03 DIAGNOSIS — Z71.6 TOBACCO ABUSE COUNSELING: ICD-10-CM

## 2022-08-03 DIAGNOSIS — B07.0 PLANTAR VERRUCA: Primary | ICD-10-CM

## 2022-08-03 DIAGNOSIS — E11.9 DIABETES MELLITUS TYPE 2, NONINSULIN DEPENDENT (H): ICD-10-CM

## 2022-08-03 DIAGNOSIS — L84 CALLUS OF FOOT: ICD-10-CM

## 2022-08-03 DIAGNOSIS — F17.210 CIGARETTE NICOTINE DEPENDENCE WITHOUT COMPLICATION: ICD-10-CM

## 2022-08-03 PROCEDURE — G0463 HOSPITAL OUTPT CLINIC VISIT: HCPCS | Mod: 25

## 2022-08-03 PROCEDURE — G0463 HOSPITAL OUTPT CLINIC VISIT: HCPCS

## 2022-08-03 PROCEDURE — 99024 POSTOP FOLLOW-UP VISIT: CPT | Performed by: PODIATRIST

## 2022-08-03 PROCEDURE — 17110 DESTRUCTION B9 LES UP TO 14: CPT | Performed by: PODIATRIST

## 2022-08-03 PROCEDURE — 99406 BEHAV CHNG SMOKING 3-10 MIN: CPT | Performed by: PODIATRIST

## 2022-08-03 ASSESSMENT — PAIN SCALES - GENERAL: PAINLEVEL: NO PAIN (0)

## 2022-08-03 NOTE — NURSING NOTE
"Chief Complaint   Patient presents with     Plantar Wart       Initial /70 (BP Location: Left arm, Patient Position: Sitting, Cuff Size: Adult Regular)   Pulse 90   Temp 96.8  F (36  C) (Tympanic)   SpO2 97%  Estimated body mass index is 32.34 kg/m  as calculated from the following:    Height as of 6/28/22: 1.753 m (5' 9\").    Weight as of 6/28/22: 99.3 kg (219 lb).  Medication Reconciliation: yared Puckett  "

## 2022-08-03 NOTE — PROGRESS NOTES
Chief complaint: Patient presents with:  Plantar Wart      History of Present Illness: This 36 year old NIDDM II male is seen for follow-up management of a wart on the RIGHT lateral foot.     He is using Imiquimod three times a day. He is also using an anti-fungal powder and cream on his feet.    He does not think the wart site blistered much after his last appointment and it has not caused him a lot of pain.    He is here to have his wart treated again today.    Patient smokes 1/2 ppd and he is not ready to quit at this time.     No further pedal complaints today.         /70 (BP Location: Left arm, Patient Position: Sitting, Cuff Size: Adult Regular)   Pulse 90   Temp 96.8  F (36  C) (Tympanic)   SpO2 97%     Patient Active Problem List   Diagnosis     Diabetes mellitus, type 2 (H)       History reviewed. No pertinent surgical history.    Current Outpatient Medications   Medication     acetaminophen (TYLENOL) 325 MG tablet     albuterol (PROAIR HFA) 108 (90 Base) MCG/ACT inhaler     aspirin (ASA) 81 MG chewable tablet     benzonatate (TESSALON) 100 MG capsule     docusate sodium (COLACE) 100 MG capsule     imiquimod (ALDARA) 5 % external cream     metFORMIN (GLUCOPHAGE) 500 MG tablet     rosuvastatin (CRESTOR) 5 MG tablet     spacer ([facility]) holding chamber     No current facility-administered medications for this visit.        No Known Allergies    Family History   Problem Relation Age of Onset     Substance Abuse Mother      No Known Problems Father      Diabetes Sister         During pregnancy     Unexplained death Brother         unknown cause in infancy     No Known Problems Maternal Grandmother      No Known Problems Maternal Grandfather      No Known Problems Paternal Grandmother      No Known Problems Paternal Grandfather        Social History     Socioeconomic History     Marital status: Single     Spouse name: None     Number of children: None     Years of education: None      Highest education level: None   Tobacco Use     Smoking status: Current Every Day Smoker     Packs/day: 0.50     Start date: 1999     Smokeless tobacco: Never Used     Tobacco comment: pt denies quit plan 7/27/2022   Vaping Use     Vaping Use: Never used   Substance and Sexual Activity     Alcohol use: Yes     Comment: rare     Drug use: No       ROS: 10 point ROS neg other than the symptoms noted above in the HPI.  EXAM  Constitutional: healthy, alert and no distress    Psychiatric: mentation appears normal and affect normal/bright    VASCULAR:  -Dorsalis pedis pulse +2/4 b/l  -Posterior tibial pulse +2/4 b/l  -Capillary refill time < 3 seconds to b/l hallux  -Hair growth Present to b/l anterior legs and ankles  NEURO:  -Light touch sensation intact to b/l plantar forefoot  DERM:  -Skin temperature, texture and turgor WNL b/l  -Soft tissue mass located on the RIGHT lateral fifth metatarsal head    08/03/2022  ---Mass is consistent with verrucae appearance measuring 0.2cm x 0.2cm x 0.1cm  ---Overlying hyperkeratotic lesion   ---Punctate black spots within mass with punctate bleeding post debridement  ---Skin lines were not retained post debridement of lesion but were retained to the outer rim of the lesion  07/27/2022  ---Mass is consistent with verrucae appearance measuring 0.3cm x 0.3cm x 0.1cm  ---Overlying hyperkeratotic lesion   ---Punctate black spots within mass with punctate bleeding post debridement  ---Skin lines were not retained post debridement of lesion  -Skin mild-to-moderately erythematous with flaking of skin to bilateral plantar feet in a moccasin distribution    MSK:  -Pain on palpation to the RIGHT lateral fifth metatarsal head soft tissue mass   -Muscle strength of ankles +5/5 for dorsiflexion, plantarflexion, ABDUction and ADDuction b/l  ============================================================    ASSESSMENT:  (B07.0) Plantar verruca  (primary encounter diagnosis)    (L84) Callus of  foot    (B35.3) Tinea pedis of both feet    (E11.9) Diabetes mellitus type 2, noninsulin dependent (H)    (Z71.6) Tobacco abuse counseling    (F17.210) Cigarette nicotine dependence without complication        PLAN:  -Patient evaluated and examined. Treatment options discussed with no educational barriers noted.    -Athlete's Foot:  ---Continue to apply anti-fungal foot cream to the feet every morning and evening  ---Continue to apply an anti-fungal foot powder to the feet daily prior to applying white, cotton socks  -------------------------------    Wart treatment:  -Informed consent obtained for destruction of plantar lesion x 1 wart(s) located on RIGHT lateral foot:  ---A time out was performed to identify the correct patient, procedure and laterality    -Patient's wart has improved in size.    ---Pared callus to punctate bleeding  ---Applied liquid nitrogen to tolerance (approximately 1-2 seconds per application) x 6 applications  ---Applied a dressing over the wart site with gauze and Medipore tape  ----Patient was instructed to look for SOI (redness, swelling, pain, purulence, fever, chills, nausea, vomiting) and to return to podiatry or the emergency department immediately if there are any SOI.    -Imiquimod ordered with three refills on 07/27/2022. Patient is to continue to use this three times weekly    -Tobacco cessation discussed with patient including the benefits of quitting and the risks of not quitting. The Quit Plan referral was offered and the patient is not interested in the referral today. Patient is not interested in quitting today.    -Patient in agreement with the above treatment plan and all of patient's questions were answered.      RTC weekly through August, 2022, or until the wart is resolved ([facility] location when possible)        Loraine Elaine, REVA, REVA

## 2022-08-10 ENCOUNTER — OFFICE VISIT (OUTPATIENT)
Dept: PODIATRY | Facility: OTHER | Age: 37
End: 2022-08-10
Attending: PODIATRIST
Payer: COMMERCIAL

## 2022-08-10 VITALS
TEMPERATURE: 97.3 F | OXYGEN SATURATION: 97 % | HEART RATE: 87 BPM | DIASTOLIC BLOOD PRESSURE: 81 MMHG | SYSTOLIC BLOOD PRESSURE: 128 MMHG

## 2022-08-10 DIAGNOSIS — F17.210 CIGARETTE NICOTINE DEPENDENCE WITHOUT COMPLICATION: ICD-10-CM

## 2022-08-10 DIAGNOSIS — L84 CALLUS OF FOOT: ICD-10-CM

## 2022-08-10 DIAGNOSIS — B07.0 PLANTAR VERRUCA: Primary | ICD-10-CM

## 2022-08-10 DIAGNOSIS — E11.9 DIABETES MELLITUS TYPE 2, NONINSULIN DEPENDENT (H): ICD-10-CM

## 2022-08-10 DIAGNOSIS — B35.3 TINEA PEDIS OF BOTH FEET: ICD-10-CM

## 2022-08-10 DIAGNOSIS — Z71.6 TOBACCO ABUSE COUNSELING: ICD-10-CM

## 2022-08-10 PROCEDURE — G0463 HOSPITAL OUTPT CLINIC VISIT: HCPCS | Mod: 25

## 2022-08-10 PROCEDURE — 99406 BEHAV CHNG SMOKING 3-10 MIN: CPT | Performed by: PODIATRIST

## 2022-08-10 PROCEDURE — 17110 DESTRUCTION B9 LES UP TO 14: CPT | Performed by: PODIATRIST

## 2022-08-10 ASSESSMENT — PAIN SCALES - GENERAL: PAINLEVEL: NO PAIN (0)

## 2022-08-10 NOTE — PROGRESS NOTES
Chief complaint: Patient presents with:  Plantar Wart      History of Present Illness: This 36 year old NIDDM II male is seen for follow-up management of a wart on the RIGHT lateral foot.     He is using Imiquimod three times a day. He is also using an anti-fungal powder and cream on his feet.    He thought the area blistered a little. His wife used a file on the nail file before using the Imiquimod.    He is here to have his wart treated again today.    Patient smokes 1/2 ppd and he is not ready to quit at this time.     No further pedal complaints today.         /81 (BP Location: Left arm, Patient Position: Sitting, Cuff Size: Adult Regular)   Pulse 87   Temp 97.3  F (36.3  C) (Tympanic)   SpO2 97%     Patient Active Problem List   Diagnosis     Diabetes mellitus, type 2 (H)       History reviewed. No pertinent surgical history.    Current Outpatient Medications   Medication     acetaminophen (TYLENOL) 325 MG tablet     albuterol (PROAIR HFA) 108 (90 Base) MCG/ACT inhaler     aspirin (ASA) 81 MG chewable tablet     benzonatate (TESSALON) 100 MG capsule     docusate sodium (COLACE) 100 MG capsule     imiquimod (ALDARA) 5 % external cream     metFORMIN (GLUCOPHAGE) 500 MG tablet     rosuvastatin (CRESTOR) 5 MG tablet     spacer (OPTICHAMBER CANDICE) holding chamber     No current facility-administered medications for this visit.        No Known Allergies    Family History   Problem Relation Age of Onset     Substance Abuse Mother      No Known Problems Father      Diabetes Sister         During pregnancy     Unexplained death Brother         unknown cause in infancy     No Known Problems Maternal Grandmother      No Known Problems Maternal Grandfather      No Known Problems Paternal Grandmother      No Known Problems Paternal Grandfather        Social History     Socioeconomic History     Marital status: Single     Spouse name: None     Number of children: None     Years of education: None     Highest  education level: None   Tobacco Use     Smoking status: Current Every Day Smoker     Packs/day: 0.50     Start date: 1999     Smokeless tobacco: Never Used     Tobacco comment: pt denies quit plan 7/27/2022   Vaping Use     Vaping Use: Never used   Substance and Sexual Activity     Alcohol use: Yes     Comment: rare     Drug use: No       ROS: 10 point ROS neg other than the symptoms noted above in the HPI.  EXAM  Constitutional: healthy, alert and no distress    Psychiatric: mentation appears normal and affect normal/bright    VASCULAR:  -Dorsalis pedis pulse +2/4 b/l  -Posterior tibial pulse +2/4 b/l  -Capillary refill time < 3 seconds to b/l hallux  -Hair growth Present to b/l anterior legs and ankles  NEURO:  -Light touch sensation intact to b/l plantar forefoot  DERM:  -Skin temperature, texture and turgor WNL b/l  -Soft tissue mass located on the RIGHT lateral fifth metatarsal head  08/10/2022  ---Mass is consistent with verrucae appearance measuring 0.1cm x 0.1cm x 0.1cm  ---Overlying hyperkeratotic lesion   ---Punctate black spots within mass with punctate bleeding post debridement  ---Skin lines were not retained post debridement of lesion but were retained to the outer rim of the lesion  08/03/2022  ---Mass is consistent with verrucae appearance measuring 0.2cm x 0.2cm x 0.1cm  ---Overlying hyperkeratotic lesion   ---Punctate black spots within mass with punctate bleeding post debridement  ---Skin lines were not retained post debridement of lesion but were retained to the outer rim of the lesion  07/27/2022  ---Mass is consistent with verrucae appearance measuring 0.3cm x 0.3cm x 0.1cm  ---Overlying hyperkeratotic lesion   ---Punctate black spots within mass with punctate bleeding post debridement  ---Skin lines were not retained post debridement of lesion  -Skin mild-to-moderately erythematous with flaking of skin to bilateral plantar feet in a moccasin distribution    MSK:  -Pain on palpation to the  RIGHT lateral fifth metatarsal head soft tissue mass   -Muscle strength of ankles +5/5 for dorsiflexion, plantarflexion, ABDUction and ADDuction b/l  ============================================================    ASSESSMENT:  (B07.0) Plantar verruca  (primary encounter diagnosis)    (L84) Callus of foot    (B35.3) Tinea pedis of both feet    (E11.9) Diabetes mellitus type 2, noninsulin dependent (H)    (Z71.6) Tobacco abuse counseling    (F17.210) Cigarette nicotine dependence without complication        PLAN:  -Patient evaluated and examined. Treatment options discussed with no educational barriers noted.    -Athlete's Foot:  ---Continue to apply anti-fungal foot cream to the feet every morning and evening  ---Continue to apply an anti-fungal foot powder to the feet daily prior to applying white, cotton socks  -------------------------------    Wart treatment:  -Informed consent obtained for destruction of plantar lesion x 1 wart(s) located on RIGHT lateral foot:  ---A time out was performed to identify the correct patient, procedure and laterality    -Patient's wart has improved in size.    ---Pared callus to punctate bleeding  ---Applied liquid nitrogen to tolerance (approximately 1-2 seconds per application) x 6 applications  ---Applied a dressing over the wart site with gauze and Medipore tape  ----Patient was instructed to look for SOI (redness, swelling, pain, purulence, fever, chills, nausea, vomiting) and to return to podiatry or the emergency department immediately if there are any SOI.    -Imiquimod ordered with three refills on 07/27/2022. Patient is to continue to use this three times weekly    -Tobacco cessation discussed with patient including the benefits of quitting and the risks of not quitting. The Quit Plan referral was offered and the patient is not interested in the referral today. Patient is not interested in quitting today.    -Patient in agreement with the above treatment plan and all of  patient's questions were answered.      RTC weekly through August, 2022, or until the wart is resolved ([facility] location when possible)        Loraine Elaine DPM, REVA

## 2022-08-10 NOTE — NURSING NOTE
"Chief Complaint   Patient presents with     Plantar Wart       Initial /81 (BP Location: Left arm, Patient Position: Sitting, Cuff Size: Adult Regular)   Pulse 87   Temp 97.3  F (36.3  C) (Tympanic)   SpO2 97%  Estimated body mass index is 32.34 kg/m  as calculated from the following:    Height as of 6/28/22: 1.753 m (5' 9\").    Weight as of 6/28/22: 99.3 kg (219 lb).  Medication Reconciliation: yared Puckett  "

## 2022-08-17 ENCOUNTER — OFFICE VISIT (OUTPATIENT)
Dept: PODIATRY | Facility: OTHER | Age: 37
End: 2022-08-17
Attending: PODIATRIST
Payer: COMMERCIAL

## 2022-08-17 VITALS
DIASTOLIC BLOOD PRESSURE: 78 MMHG | HEART RATE: 98 BPM | TEMPERATURE: 97.7 F | SYSTOLIC BLOOD PRESSURE: 110 MMHG | OXYGEN SATURATION: 99 %

## 2022-08-17 DIAGNOSIS — Z71.6 TOBACCO ABUSE COUNSELING: ICD-10-CM

## 2022-08-17 DIAGNOSIS — E11.9 DIABETES MELLITUS TYPE 2, NONINSULIN DEPENDENT (H): ICD-10-CM

## 2022-08-17 DIAGNOSIS — B07.0 PLANTAR VERRUCA: Primary | ICD-10-CM

## 2022-08-17 DIAGNOSIS — L84 CALLUS OF FOOT: ICD-10-CM

## 2022-08-17 DIAGNOSIS — B35.3 TINEA PEDIS OF BOTH FEET: ICD-10-CM

## 2022-08-17 DIAGNOSIS — F17.210 CIGARETTE NICOTINE DEPENDENCE WITHOUT COMPLICATION: ICD-10-CM

## 2022-08-17 PROCEDURE — 17110 DESTRUCTION B9 LES UP TO 14: CPT | Performed by: PODIATRIST

## 2022-08-17 PROCEDURE — 99024 POSTOP FOLLOW-UP VISIT: CPT | Mod: 25 | Performed by: PODIATRIST

## 2022-08-17 PROCEDURE — G0463 HOSPITAL OUTPT CLINIC VISIT: HCPCS | Mod: 25

## 2022-08-17 ASSESSMENT — PAIN SCALES - GENERAL: PAINLEVEL: NO PAIN (0)

## 2022-08-17 NOTE — PATIENT INSTRUCTIONS
Thank you for allowing  and our Podiatry team to participate in your care. Please call our office at [phone] with scheduling questions or with any other questions or concerns.

## 2022-08-17 NOTE — PROGRESS NOTES
Chief complaint: Patient presents with:  Plantar Wart      History of Present Illness: This 36 year old NIDDM II male is seen for follow-up management of a wart on the RIGHT lateral foot.     He is using Imiquimod three times a day. He is also using an anti-fungal powder and cream on his feet.    He did not have a blister on the foot this time. His wife used a file on the nail file on the callus before using the Imiquimod on his foot.    He is here to have his wart treated again today.    Patient smokes 1/2 ppd and he is not ready to quit at this time.     No further pedal complaints today.         /78 (BP Location: Left arm, Patient Position: Sitting, Cuff Size: Adult Regular)   Pulse 98   Temp 97.7  F (36.5  C) (Tympanic)   SpO2 99%     Patient Active Problem List   Diagnosis     Diabetes mellitus, type 2 (H)       No past surgical history on file.    Current Outpatient Medications   Medication     acetaminophen (TYLENOL) 325 MG tablet     albuterol (PROAIR HFA) 108 (90 Base) MCG/ACT inhaler     aspirin (ASA) 81 MG chewable tablet     benzonatate (TESSALON) 100 MG capsule     docusate sodium (COLACE) 100 MG capsule     imiquimod (ALDARA) 5 % external cream     metFORMIN (GLUCOPHAGE) 500 MG tablet     rosuvastatin (CRESTOR) 5 MG tablet     spacer (OPTICHAMBER CANDICE) holding chamber     No current facility-administered medications for this visit.        No Known Allergies    Family History   Problem Relation Age of Onset     Substance Abuse Mother      No Known Problems Father      Diabetes Sister         During pregnancy     Unexplained death Brother         unknown cause in infancy     No Known Problems Maternal Grandmother      No Known Problems Maternal Grandfather      No Known Problems Paternal Grandmother      No Known Problems Paternal Grandfather        Social History     Socioeconomic History     Marital status: Single     Spouse name: None     Number of children: None     Years of education:  None     Highest education level: None   Tobacco Use     Smoking status: Current Every Day Smoker     Packs/day: 0.50     Start date: 1999     Smokeless tobacco: Never Used     Tobacco comment: pt denies quit plan 7/27/2022   Vaping Use     Vaping Use: Never used   Substance and Sexual Activity     Alcohol use: Yes     Comment: rare     Drug use: No       ROS: 10 point ROS neg other than the symptoms noted above in the HPI.  EXAM  Constitutional: healthy, alert and no distress    Psychiatric: mentation appears normal and affect normal/bright    VASCULAR:  -Dorsalis pedis pulse +2/4 b/l  -Posterior tibial pulse +2/4 b/l  -Capillary refill time < 3 seconds to b/l hallux  -Hair growth Present to b/l anterior legs and ankles  NEURO:  -Light touch sensation intact to b/l plantar forefoot  DERM:  -Skin temperature, texture and turgor WNL b/l  -Skin mildly erythematous with flaking of skin to bilateral plantar feet in a moccasin distribution  -Soft tissue mass located on the RIGHT lateral fifth metatarsal head  08/17/2022  ---Mass is consistent with verrucae appearance measuring 0.1cm x 0.1cm x 0.1cm  ---Overlying hyperkeratotic lesion   ---Punctate black spots within mass with no punctate bleeding post debridement  ---Skin lines were almost fully retained post debridement of lesion   08/10/2022  ---Mass is consistent with verrucae appearance measuring 0.1cm x 0.1cm x 0.1cm  ---Overlying hyperkeratotic lesion   ---Punctate black spots within mass with punctate bleeding post debridement  ---Skin lines were not retained post debridement of lesion but were retained to the outer rim of the lesion  08/03/2022  ---Mass is consistent with verrucae appearance measuring 0.2cm x 0.2cm x 0.1cm  ---Overlying hyperkeratotic lesion   ---Punctate black spots within mass with punctate bleeding post debridement  ---Skin lines were not retained post debridement of lesion but were retained to the outer rim of the lesion  07/27/2022  ---Mass  is consistent with verrucae appearance measuring 0.3cm x 0.3cm x 0.1cm  ---Overlying hyperkeratotic lesion   ---Punctate black spots within mass with punctate bleeding post debridement  ---Skin lines were not retained post debridement of lesion    MSK:  -Pain on palpation to the RIGHT lateral fifth metatarsal head soft tissue mass   -Muscle strength of ankles +5/5 for dorsiflexion, plantarflexion, ABDUction and ADDuction b/l  ============================================================    ASSESSMENT:  (B07.0) Plantar verruca  (primary encounter diagnosis)    (L84) Callus of foot    (B35.3) Tinea pedis of both feet    (E11.9) Diabetes mellitus type 2, noninsulin dependent (H)    (Z71.6) Tobacco abuse counseling    (F17.210) Cigarette nicotine dependence without complication        PLAN:  -Patient evaluated and examined. Treatment options discussed with no educational barriers noted.    -Athlete's Foot:  ---Continue to apply anti-fungal foot cream to the feet every morning and evening  ---Continue to apply an anti-fungal foot powder to the feet daily prior to applying white, cotton socks  -------------------------------    Wart treatment:  -Informed consent obtained for destruction of plantar lesion x 1 wart(s) located on RIGHT lateral foot:  ---A time out was performed to identify the correct patient, procedure and laterality    -Patient's wart has almost fully resolved. Skin lines were almost fully retained post paring. Plantar warts are very persistent and there were black spots to the lesion pre paring. Will treat the lesion one more time and keep it under more occlusion for the next week.     ---Pared callus to punctate bleeding  ---Applied liquid nitrogen to tolerance (approximately 1-2 seconds per application) x 6 applications  ---Applied a bandaid over the wart site. Patient is advised to resume the Imiquimod tomorrow and apply duct tape or a band aid on the foot for a few days to create more maceration to  peel off the top layer of the wart.  ----Patient has been instructed to look for signs of infection (redness, swelling, pain, purulence, fever, chills, nausea, vomiting) and to return to podiatry or the emergency department immediately if there are any SOI.    -Imiquimod ordered with three refills on 07/27/2022. Patient is to continue to use this three times weekly    -Tobacco cessation discussed with patient including the benefits of quitting and the risks of not quitting. The Quit Plan referral was offered and the patient is not interested in the referral today. Patient is not interested in quitting today.    -Patient in agreement with the above treatment plan and all of patient's questions were answered.      RTC weekly through August, 2022, or until the wart is resolved ([facility] location when possible)        Loraine Elaine, REVA, REVA

## 2022-08-22 DIAGNOSIS — E11.9 TYPE 2 DIABETES MELLITUS WITHOUT COMPLICATION, WITHOUT LONG-TERM CURRENT USE OF INSULIN (H): ICD-10-CM

## 2022-08-23 NOTE — TELEPHONE ENCOUNTER
Metformin      Last Written Prescription Date:  05/16/22  Last Fill Quantity: 120,   # refills: 2  Last Office Visit: 06/28/22  Future Office visit:    Next 5 appointments (look out 90 days)    Aug 24, 2022  2:45 PM  (Arrive by 2:30 PM)  Return Visit with Loraine Elaine DPM  [facility] [address] [place] [id number] [phone]   Aug 31, 2022 11:00 AM  (Arrive by 10:45 AM)  Return Visit with Loraine Elaine DPM  [facility] [address] [place] [address] [phone] [phone]

## 2022-08-24 ENCOUNTER — OFFICE VISIT (OUTPATIENT)
Dept: PODIATRY | Facility: OTHER | Age: 37
End: 2022-08-24
Attending: PODIATRIST
Payer: COMMERCIAL

## 2022-08-24 VITALS
SYSTOLIC BLOOD PRESSURE: 123 MMHG | WEIGHT: 210 LBS | HEART RATE: 88 BPM | OXYGEN SATURATION: 97 % | HEIGHT: 69 IN | DIASTOLIC BLOOD PRESSURE: 80 MMHG | BODY MASS INDEX: 31.1 KG/M2 | TEMPERATURE: 97.1 F

## 2022-08-24 DIAGNOSIS — L84 CALLUS OF FOOT: Primary | ICD-10-CM

## 2022-08-24 DIAGNOSIS — B35.3 TINEA PEDIS OF BOTH FEET: ICD-10-CM

## 2022-08-24 DIAGNOSIS — E11.9 DIABETES MELLITUS TYPE 2, NONINSULIN DEPENDENT (H): ICD-10-CM

## 2022-08-24 DIAGNOSIS — Z71.6 TOBACCO ABUSE COUNSELING: ICD-10-CM

## 2022-08-24 DIAGNOSIS — F17.210 CIGARETTE NICOTINE DEPENDENCE WITHOUT COMPLICATION: ICD-10-CM

## 2022-08-24 PROCEDURE — 99212 OFFICE O/P EST SF 10 MIN: CPT | Mod: 24 | Performed by: PODIATRIST

## 2022-08-24 PROCEDURE — G0463 HOSPITAL OUTPT CLINIC VISIT: HCPCS

## 2022-08-24 ASSESSMENT — PAIN SCALES - GENERAL: PAINLEVEL: NO PAIN (0)

## 2022-08-24 NOTE — NURSING NOTE
"Chief Complaint   Patient presents with     Wart       Initial /80 (BP Location: Left arm, Patient Position: Chair, Cuff Size: Adult Regular)   Pulse 88   Temp 97.1  F (36.2  C) (Tympanic)   Ht 1.753 m (5' 9\")   Wt 95.3 kg (210 lb)   SpO2 97%   BMI 31.01 kg/m   Estimated body mass index is 31.01 kg/m  as calculated from the following:    Height as of this encounter: 1.753 m (5' 9\").    Weight as of this encounter: 95.3 kg (210 lb).  Medication Reconciliation: complete  SAI ORTIZ LPN    "

## 2022-08-25 NOTE — PROGRESS NOTES
"Chief complaint: Patient presents with:  Wart      History of Present Illness: This 36 year old NIDDM II male is seen for follow-up management of a wart on the RIGHT lateral foot.     He is using Imiquimod three times a day. He is also using an anti-fungal powder and cream on his feet.    He did not have a blister on the foot this time. His wife used a file on the nail file on the callus before using the Imiquimod on his foot.    He is here to have his wart treated again today.    Patient smokes 1/2 ppd and he is not ready to quit at this time.     No further pedal complaints today.         /80 (BP Location: Left arm, Patient Position: Chair, Cuff Size: Adult Regular)   Pulse 88   Temp 97.1  F (36.2  C) (Tympanic)   Ht 1.753 m (5' 9\")   Wt 95.3 kg (210 lb)   SpO2 97%   BMI 31.01 kg/m      Patient Active Problem List   Diagnosis     Diabetes mellitus, type 2 (H)       History reviewed. No pertinent surgical history.    Current Outpatient Medications   Medication     acetaminophen (TYLENOL) 325 MG tablet     albuterol (PROAIR HFA) 108 (90 Base) MCG/ACT inhaler     aspirin (ASA) 81 MG chewable tablet     benzonatate (TESSALON) 100 MG capsule     docusate sodium (COLACE) 100 MG capsule     imiquimod (ALDARA) 5 % external cream     metFORMIN (GLUCOPHAGE) 500 MG tablet     rosuvastatin (CRESTOR) 5 MG tablet     spacer ([facility]) holding chamber     No current facility-administered medications for this visit.        No Known Allergies    Family History   Problem Relation Age of Onset     Substance Abuse Mother      No Known Problems Father      Diabetes Sister         During pregnancy     Unexplained death Brother         unknown cause in infancy     No Known Problems Maternal Grandmother      No Known Problems Maternal Grandfather      No Known Problems Paternal Grandmother      No Known Problems Paternal Grandfather        Social History     Socioeconomic History     Marital status: Single     " Spouse name: None     Number of children: None     Years of education: None     Highest education level: None   Tobacco Use     Smoking status: Current Every Day Smoker     Packs/day: 0.50     Start date: 1999     Smokeless tobacco: Never Used     Tobacco comment: pt denies quit plan 7/27/2022   Vaping Use     Vaping Use: Never used   Substance and Sexual Activity     Alcohol use: Yes     Comment: rare     Drug use: No       ROS: 10 point ROS neg other than the symptoms noted above in the HPI.  EXAM  Constitutional: healthy, alert and no distress    Psychiatric: mentation appears normal and affect normal/bright    VASCULAR:  -Dorsalis pedis pulse +2/4 b/l  -Posterior tibial pulse +2/4 b/l  -Capillary refill time < 3 seconds to b/l hallux  -Hair growth Present to b/l anterior legs and ankles  NEURO:  -Light touch sensation intact to b/l plantar forefoot  DERM:  -Skin temperature, texture and turgor WNL b/l  -Skin mildly erythematous with flaking of skin to bilateral plantar feet in a moccasin distribution  -Soft tissue mass located on the RIGHT lateral fifth metatarsal head  08/24/2022  ---Mild overlying hyperkeratotic lesion   ---No further punctate black spots within mass with no punctate bleeding post debridement  ---Skin lines were fully retained post debridement of lesion   08/17/2022  ---Mass is consistent with verrucae appearance measuring 0.1cm x 0.1cm x 0.1cm  ---Overlying hyperkeratotic lesion   ---Punctate black spots within mass with no punctate bleeding post debridement  ---Skin lines were almost fully retained post debridement of lesion   08/10/2022  ---Mass is consistent with verrucae appearance measuring 0.1cm x 0.1cm x 0.1cm  ---Overlying hyperkeratotic lesion   ---Punctate black spots within mass with punctate bleeding post debridement  ---Skin lines were not retained post debridement of lesion but were retained to the outer rim of the lesion  08/03/2022  ---Mass is consistent with verrucae  appearance measuring 0.2cm x 0.2cm x 0.1cm  ---Overlying hyperkeratotic lesion   ---Punctate black spots within mass with punctate bleeding post debridement  ---Skin lines were not retained post debridement of lesion but were retained to the outer rim of the lesion  07/27/2022  ---Mass is consistent with verrucae appearance measuring 0.3cm x 0.3cm x 0.1cm  ---Overlying hyperkeratotic lesion   ---Punctate black spots within mass with punctate bleeding post debridement  ---Skin lines were not retained post debridement of lesion    MSK:  -Pain on palpation to the RIGHT lateral fifth metatarsal head soft tissue mass   -Muscle strength of ankles +5/5 for dorsiflexion, plantarflexion, ABDUction and ADDuction b/l  ============================================================    ASSESSMENT:    (L84) Callus of foot (primary encounter diagnosis)    (B35.3) Tinea pedis of both feet    (E11.9) Diabetes mellitus type 2, noninsulin dependent (H)    (Z71.6) Tobacco abuse counseling    (F17.210) Cigarette nicotine dependence without complication        PLAN:  -Patient evaluated and examined. Treatment options discussed with no educational barriers noted.    -The wart site was mildly pared. There are no obvious areas of black spots or wart today. Patient is advised to continue with the Imiquimod for two more weeks in case there is a small remnant of the wart that is not visible.   -Will follow-up in one month to evaluate the wart site. If it has returned, then will consider excision of the wart versus resuming conservative treatment weekly in the office. Patient is in agreement with this plan.  ---Patient declined a final liquid nitrogen application to the foot today in attempt to treat any potential remnant of the wart.    -Imiquimod ordered with three refills on 07/27/2022. Patient is to continue to use this three times weekly    -Tobacco cessation discussed with patient including the benefits of quitting and the risks of not  quitting. The Quit Plan referral was offered and the patient is not interested in the referral today. Patient is not interested in quitting today.        -Patient in agreement with the above treatment plan and all of patient's questions were answered.      Return to clinic one months in [facility] to evaluate wart site of RIGHT foot        Loraine Elaine DPM, REVA

## 2022-09-04 ENCOUNTER — HEALTH MAINTENANCE LETTER (OUTPATIENT)
Age: 37
End: 2022-09-04

## 2022-09-15 NOTE — PROGRESS NOTES
"  Assessment & Plan     Chronic left shoulder pain  - XR SHOULDER LEFT G/E 2 VIEWS (Clinic Performed)  - Orthopedic  Referral; Future  - ibuprofen (ADVIL/MOTRIN) 800 MG tablet; Take 1 tablet (800 mg) by mouth every 8 hours as needed for moderate pain    Need for prophylactic vaccination and inoculation against influenza  - INFLUENZA VACCINE IM > 6 MONTHS VALENT IIV4 (AFLURIA/FLUZONE)    Ordering of each unique test  Prescription drug management  No LOS data to display   Time spent doing chart review, history and exam, documentation and further activities per the note     BMI:   Estimated body mass index is 32.64 kg/m  as calculated from the following:    Height as of this encounter: 1.753 m (5' 9\").    Weight as of this encounter: 100.2 kg (221 lb).   Weight management plan: Discussed healthy diet and exercise guidelines      No follow-ups on file.    Pebbles Cunningham, [facility] - [place] is a 37 year old presenting for the following health issues:  Shoulder Pain and Imm/Inj (Flu Shot)    HPI   Pain History:  When did you first notice your pain? - Acute Pain   Have you seen anyone else for your pain? No  Where in your body do you have pain? Musculoskeletal problem/pain  Onset/Duration: ongoing, now worsening. Does not remember how it began but it started months ago. Has been worsening over the past few days to the point where he needs to hold his left arm with his right for the support.   Description  Location: shoulder - left  Joint Swelling: No  Redness: No  Pain: YES  Warmth: No  Intensity:  varies  Progression of Symptoms:  worsening  Accompanying signs and symptoms:   Fevers: No  Numbness/tingling/weakness: No  History  Trauma to the area: No  Recent illness:  No  Previous similar problem: No  Previous evaluation:  No  Precipitating or alleviating factors:  Aggravating factors include: none  Therapies tried and outcome: Ibuprofen provides relief    Has not " "tried ice or heat.     Review of Systems   Constitutional, HEENT, cardiovascular, pulmonary, gi and gu systems are negative, except as otherwise noted.      Objective    /68 (BP Location: Right arm, Patient Position: Sitting, Cuff Size: Adult Large)   Pulse 92   Temp 97.3  F (36.3  C) (Tympanic)   Resp 16   Ht 1.753 m (5' 9\")   Wt 100.2 kg (221 lb)   SpO2 97%   BMI 32.64 kg/m    Body mass index is 32.64 kg/m .       Physical Exam   GENERAL: healthy, alert and no distress  MS: LUE exam shows no deformities, no erythema, induration, or nodules, no evidence of joint effusion. Tenderness to anterior shoulder joint. No catching or clicking. No crepitus. No significant reduction in active or passive ROM.          Results for orders placed or performed in visit on 09/16/22   XR SHOULDER LEFT G/E 2 VIEWS (Clinic Performed)     Status: None    Narrative    Exam: XR SHOULDER LEFT G/E 3 VIEWS     History:Male, age 37 years, Pain    Comparison:  No relevant prior imaging.    Technique: Four views are submitted.    Findings: Bones are normally mineralized. No evidence of acute or  subacute fracture.  No evidence of dislocation.           Impression    Impression:  No evidence of acute or subacute bony abnormality.     Os acromiale.    GENTRY GARCIA MD         SYSTEM ID:  [id number]         "

## 2022-09-16 ENCOUNTER — ANCILLARY PROCEDURE (OUTPATIENT)
Dept: GENERAL RADIOLOGY | Facility: OTHER | Age: 37
End: 2022-09-16
Attending: NURSE PRACTITIONER
Payer: COMMERCIAL

## 2022-09-16 ENCOUNTER — OFFICE VISIT (OUTPATIENT)
Dept: FAMILY MEDICINE | Facility: OTHER | Age: 37
End: 2022-09-16
Attending: NURSE PRACTITIONER
Payer: COMMERCIAL

## 2022-09-16 VITALS
TEMPERATURE: 97.3 F | BODY MASS INDEX: 32.73 KG/M2 | WEIGHT: 221 LBS | DIASTOLIC BLOOD PRESSURE: 68 MMHG | HEART RATE: 92 BPM | HEIGHT: 69 IN | OXYGEN SATURATION: 97 % | SYSTOLIC BLOOD PRESSURE: 118 MMHG | RESPIRATION RATE: 16 BRPM

## 2022-09-16 DIAGNOSIS — G89.29 CHRONIC LEFT SHOULDER PAIN: Primary | ICD-10-CM

## 2022-09-16 DIAGNOSIS — M25.512 CHRONIC LEFT SHOULDER PAIN: Primary | ICD-10-CM

## 2022-09-16 DIAGNOSIS — Z23 NEED FOR PROPHYLACTIC VACCINATION AND INOCULATION AGAINST INFLUENZA: ICD-10-CM

## 2022-09-16 PROCEDURE — 90686 IIV4 VACC NO PRSV 0.5 ML IM: CPT

## 2022-09-16 PROCEDURE — 99214 OFFICE O/P EST MOD 30 MIN: CPT | Performed by: NURSE PRACTITIONER

## 2022-09-16 PROCEDURE — G0463 HOSPITAL OUTPT CLINIC VISIT: HCPCS | Mod: 25 | Performed by: NURSE PRACTITIONER

## 2022-09-16 PROCEDURE — G0463 HOSPITAL OUTPT CLINIC VISIT: HCPCS | Performed by: NURSE PRACTITIONER

## 2022-09-16 PROCEDURE — 73030 X-RAY EXAM OF SHOULDER: CPT | Mod: TC,LT,FY

## 2022-09-16 RX ORDER — IBUPROFEN 800 MG/1
800 TABLET, FILM COATED ORAL EVERY 8 HOURS PRN
Qty: 90 TABLET | Refills: 0 | Status: SHIPPED | OUTPATIENT
Start: 2022-09-16

## 2022-09-16 ASSESSMENT — PAIN SCALES - GENERAL: PAINLEVEL: MILD PAIN (2)

## 2022-09-20 DIAGNOSIS — E11.9 TYPE 2 DIABETES MELLITUS WITHOUT COMPLICATION, WITHOUT LONG-TERM CURRENT USE OF INSULIN (H): ICD-10-CM

## 2022-09-28 ENCOUNTER — OFFICE VISIT (OUTPATIENT)
Dept: ORTHOPEDICS | Facility: OTHER | Age: 37
End: 2022-09-28
Attending: NURSE PRACTITIONER
Payer: COMMERCIAL

## 2022-09-28 VITALS
BODY MASS INDEX: 32.64 KG/M2 | TEMPERATURE: 97 F | DIASTOLIC BLOOD PRESSURE: 70 MMHG | SYSTOLIC BLOOD PRESSURE: 122 MMHG | HEIGHT: 69 IN | HEART RATE: 86 BPM | OXYGEN SATURATION: 98 %

## 2022-09-28 DIAGNOSIS — G89.29 CHRONIC LEFT SHOULDER PAIN: ICD-10-CM

## 2022-09-28 DIAGNOSIS — M25.512 PAIN IN JOINT OF LEFT SHOULDER: Primary | ICD-10-CM

## 2022-09-28 DIAGNOSIS — M25.812 SHOULDER IMPINGEMENT, LEFT: ICD-10-CM

## 2022-09-28 DIAGNOSIS — M25.512 CHRONIC LEFT SHOULDER PAIN: ICD-10-CM

## 2022-09-28 PROCEDURE — G0463 HOSPITAL OUTPT CLINIC VISIT: HCPCS

## 2022-09-28 PROCEDURE — 20610 DRAIN/INJ JOINT/BURSA W/O US: CPT | Mod: LT | Performed by: SPECIALIST

## 2022-09-28 PROCEDURE — G0463 HOSPITAL OUTPT CLINIC VISIT: HCPCS | Mod: 25

## 2022-09-28 PROCEDURE — 99203 OFFICE O/P NEW LOW 30 MIN: CPT | Mod: 25 | Performed by: SPECIALIST

## 2022-09-28 RX ORDER — TRIAMCINOLONE ACETONIDE 40 MG/ML
40 INJECTION, SUSPENSION INTRA-ARTICULAR; INTRAMUSCULAR ONCE
Status: COMPLETED | OUTPATIENT
Start: 2022-09-28 | End: 2022-09-28

## 2022-09-28 RX ORDER — LIDOCAINE HYDROCHLORIDE 10 MG/ML
5 INJECTION, SOLUTION INFILTRATION; PERINEURAL ONCE
Status: COMPLETED | OUTPATIENT
Start: 2022-09-28 | End: 2022-09-28

## 2022-09-28 RX ADMIN — TRIAMCINOLONE ACETONIDE 40 MG: 40 INJECTION, SUSPENSION INTRA-ARTICULAR; INTRAMUSCULAR at 12:03

## 2022-09-28 RX ADMIN — LIDOCAINE HYDROCHLORIDE 5 ML: 10 INJECTION, SOLUTION INFILTRATION; PERINEURAL at 12:03

## 2022-09-28 ASSESSMENT — PAIN SCALES - GENERAL: PAINLEVEL: NO PAIN (0)

## 2022-09-28 NOTE — NURSING NOTE
"Chief Complaint   Patient presents with     Consult     Chronic left shoulder pain       Initial /70 (BP Location: Right arm, Patient Position: Chair, Cuff Size: Adult Large)   Pulse 86   Temp 97  F (36.1  C) (Tympanic)   Ht 1.753 m (5' 9\")   SpO2 98%   BMI 32.64 kg/m   Estimated body mass index is 32.64 kg/m  as calculated from the following:    Height as of this encounter: 1.753 m (5' 9\").    Weight as of 9/16/22: 100.2 kg (221 lb).  Medication Reconciliation: complete  BELEN RAY LPN    "

## 2022-09-28 NOTE — PROGRESS NOTES
Service Date: 09/28/2022    HISTORY OF PRESENT ILLNESS:  Mr. Magallon is a 37-year-old male seen today for evaluation of left shoulder pain for about 3-4 months, he has had left shoulder pain, does not recall any trauma, injury, overuse or activity associated with the onset the symptoms.  He is complaining of left shoulder pain anteriorly.  He denies any radiating pain or paresthesias.  He works at a job at a [employer] where he does a lot of manual things, sometimes overhead reaching and lifting, this tends to aggravate his left shoulder pain.  He describes it as an aching pain in the shoulder.  Denies any catching or clicking or instability or previous injuries.  He had some x-rays of his shoulder taken on 09/16/2022.  Multiple views were obtained demonstrating an os acromiale otherwise negative for any acute findings.  The patient was referred to Orthopedics for evaluation.    PHYSICAL EXAMINATION:    GENERAL:  The patient is awake, alert, oriented, no acute distress.  Overall, general mood, affect and appearance are normal.  VITAL SIGNS:  Stable.  Temperature 97, pulse 86, O2 sat 98%, blood pressure 122/70.  MUSCULOSKELETAL:  On evaluation of left shoulder, there is no swelling, discoloration, deformity, or effusions.  No redness or increased warmth.  He has some tenderness over the anterolateral acromion and rotator cuff.  The AC joint is minimally tender.  He has a normal distal neurovascular exam.  He has good strength with internal and external rotation against resistance.  Good strength with abduction and forward flexion against resistance.  With his range of motion testing, he has equal range of motion to the opposite side, but does have some pain in the left shoulder with extremes of forward flexion and abduction.  Pugh and Neer impingement tests are positive.    ASSESSMENT AND PLAN:  The patient likely has impingement in his left shoulder, he does not have any real weakness to suggest rotator  cuff tear and has not had any trauma or injury.  He is relatively young for this.  I recommended that we treat him for chronic impingement, left shoulder with physical therapy and injections and over-the-counter medications and avoidance of aggravating activities.  He will do this.  We will refer him to PT.  He may just go to a couple of visits to be instructed on a home exercise program as I am not sure that has time to go to multiple visits.  Injection will be done today.  The left shoulder was prepped posteriorly.  Into the subacromial space, we injected a mixture of 1 mL of 40 mg per mL of Kenalog and 5 mL of 1% lidocaine.  The patient tolerated this well.    Joe Nur MD        D: 2022   T: 2022   MT: TONY    Name:     CHARLES RIVERAKleber  MRN:      [id number]        Account:      [id number]   :      1985           Service Date: 2022       Document: [id number]

## 2022-10-05 ENCOUNTER — HOSPITAL ENCOUNTER (OUTPATIENT)
Dept: PHYSICAL THERAPY | Facility: OTHER | Age: 37
Discharge: HOME | End: 2022-10-05
Attending: SPECIALIST | Admitting: SPECIALIST
Payer: COMMERCIAL

## 2022-10-05 DIAGNOSIS — M25.812 SHOULDER IMPINGEMENT, LEFT: ICD-10-CM

## 2022-10-05 PROCEDURE — 97110 THERAPEUTIC EXERCISES: CPT | Mod: GP

## 2022-10-05 PROCEDURE — 97162 PT EVAL MOD COMPLEX 30 MIN: CPT | Mod: GP

## 2022-10-05 NOTE — PROGRESS NOTES
10/05/22 1400   General Information   Type of Visit Initial OP Ortho PT Evaluation   Start of Care Date 10/05/22   Referring Physician Boom Obrien   Patient/Family Goals Statement Resolved shoulder/arm pain   Orders Evaluate and Treat   Date of Order 09/28/22   Certification Required? No   Medical Diagnosis Left shoulder impingement   Surgical/Medical history reviewed Yes   Precautions/Limitations no known precautions/limitations   General Information Comments Past medical history-see chart   Body Part(s)   Body Part(s) Shoulder   Presentation and Etiology   Pertinent history of current problem (include personal factors and/or comorbidities that impact the POC) Patient reports onset of shoulder pain-left a few months ago.  He denies any known injury.  He was initially seen by his primary care provider and then referred for an orthopedic consult with Dr. Nur.  He was seen by Dr. Nur on 9/28/2022 and did receive a cortisone injection.  He is now referred to physical therapy.  Patient did have an x-ray which was found to be unremarkable.  Patient reports a new additional symptom in the last few days of left biceps pain.  Patient states he has been doing a lot of work with wood-using saw, using wood splitter, and lifting wood.  He states he has been doing this for a few weeks.  He is not doing any regular exercise.  He has not yet done any regular icing.   Impairments A. Pain;D. Decreased ROM;E. Decreased flexibility;F. Decreased strength and endurance   Functional Limitations perform activities of daily living;perform required work activities;perform desired leisure / sports activities   Symptom Location Left superior shoulder and anterior upper arm/bicep region   How/Where did it occur From insidious onset   Chronicity New   Pain rating (0-10 point scale) Best (/10);Worst (/10)   Best (/10) 2   Worst (/10) 9   Pain quality C. Aching   Frequency of pain/symptoms B. Intermittent   Pain/symptoms are: The same all the  time   Pain/symptoms exacerbated by C. Lifting;D. Carrying;H. Overhead reach;L. Work tasks;K. Home tasks;M. Other   Pain exacerbation comment Using chainsaw, splitting wood, lifting wood and lifting pales at work, lying on his left side, lifting 4-year-old child   Pain/symptoms eased by C. Rest;I. OTC medication(s);J. Braces/supports   Progression of symptoms since onset: Worsened   Current / Previous Interventions   Diagnostic Tests: X-ray   X-ray Results unremarkable   Current Level of Function   Patient role/employment history A. Employed   Employment Comments He works in the [employer] at [employer]   Fall Risk Screen   Fall screen completed by PT   Have you fallen 2 or more times in the past year? No   Have you fallen and had an injury in the past year? No   Is patient a fall risk? No   Abuse Screen (yes response referral indicated)   Feels Unsafe at Home or Work/School no   Feels Threatened by Someone no   Does Anyone Try to Keep You From Having Contact with Others or Doing Things Outside Your Home? no   Physical Signs of Abuse Present no   Patient needs abuse support services and resources No   Shoulder Objective Findings   Side (if bilateral, select both right and left) Left   Cervical Screen (ROM, quadrant) Within functional limits and pain-free   Thoracic Mobility Screen Decreased thoracic extension   Shoulder ROM Comment All motions are limited due to pain   Pec Minor (supine) Flexibility Hypomobile   Shoulder Flexibility Comments Resisted elbow flexion = 4+/5 with mild discomfort   Neer's Test Positive   Drew's Test Negative   Crossover Test Negative   Shoulder Special Tests Comments Negative empty can test.  He had pain with resisted flexion abduction and external rotation   Palpation Soft tissue tension and tenderness to palpation along the left supraspinatus and infraspinatus and upper trapezius muscles.  Tenderness also along the left subacromial region and rotator cuff insertion site  tenderness also to palpation of the left biceps Muscle belly   Accessory Motion/Joint Mobility Decreased left glenohumeral joint AP glide and inferior glide   Observation No acute distress   Posture Forward head, rounded shoulder posture.  The right shoulder was elevated as compared to the left   Left Shoulder Flexion AROM 0 to 124 degrees with superior shoulder pain   Left Shoulder Flexion PROM 0 to 90 degrees   Left Shoulder Abduction AROM 0 to 113 degrees with superior shoulder pain   Left Shoulder Abduction PROM 0 to 90 degrees   Left Shoulder ER AROM 0 to 50 degrees   Left Shoulder ER PROM 0 to 60 degrees   Left Shoulder IR AROM Within normal limits   Left Shoulder IR PROM 0 to 55 degrees   Left Shoulder Flexion Strength 4 -/5   Left Shoulder Abduction Strength 4 -/5   Left Shoulder ER Strength 4 -/5   Left Shoulder IR Strength 5/5   Left Mid Trapezius Strength Decreased   Left Lower Trapezius Strength Decreased   Planned Therapy Interventions   Planned Therapy Interventions joint mobilization;manual therapy;ROM;strengthening;stretching   Planned Modality Interventions   Planned Modality Interventions Comments Modalities as indicated   Clinical Impression   Criteria for Skilled Therapeutic Interventions Met yes, treatment indicated   PT Diagnosis Left shoulder/upper arm pain   Influenced by the following impairments Pain, decreased mobility, decreased strength   Functional limitations due to impairments Lifting, carrying, reaching, household and work tasks, sleeping/lying on left side   Clinical Presentation Evolving/Changing   Clinical Presentation Rationale Clinical judgment progression of pain and function limitations symptoms noted include left biceps pain   Clinical Decision Making (Complexity) Moderate complexity   Therapy Frequency 2 times/Week   Predicted Duration of Therapy Intervention (days/wks) Up to 6 weeks   Risk & Benefits of therapy have been explained Yes   Patient, Family & other staff in  agreement with plan of care Yes   Clinical Impression Comments Patient presents with left shoulder and upper arm/biceps pain with significant decreased range of motion and strength in the shoulder especially   Education Assessment   Barriers to Learning No barriers   ORTHO GOALS   PT Ortho Eval Goals 1;2;3   Ortho Goal 1   Goal Identifier STG 1   Goal Description Decreased pain when at its worst to a 7/10   Target Date 10/19/22   Ortho Goal 2   Goal Identifier LTG 1   Goal Description Patient will demonstrate independence with home exercise program   Target Date 11/16/22   Ortho Goal 3   Goal Identifier LTG #2   Goal Description Patient was able to reach up to a high shelf with little to no difficulty   Target Date 11/16/22   Total Evaluation Time   PT Eval, Moderate Complexity Minutes (70378) 25

## 2022-10-18 ENCOUNTER — APPOINTMENT (OUTPATIENT)
Dept: GENERAL RADIOLOGY | Facility: HOSPITAL | Age: 37
End: 2022-10-18
Attending: NURSE PRACTITIONER
Payer: COMMERCIAL

## 2022-10-18 ENCOUNTER — HOSPITAL ENCOUNTER (EMERGENCY)
Facility: HOSPITAL | Age: 37
Discharge: HOME | End: 2022-10-18
Attending: NURSE PRACTITIONER | Admitting: NURSE PRACTITIONER
Payer: COMMERCIAL

## 2022-10-18 VITALS
DIASTOLIC BLOOD PRESSURE: 92 MMHG | RESPIRATION RATE: 16 BRPM | TEMPERATURE: 97.5 F | SYSTOLIC BLOOD PRESSURE: 139 MMHG | HEART RATE: 87 BPM

## 2022-10-18 DIAGNOSIS — M54.50 RIGHT-SIDED LOW BACK PAIN WITHOUT SCIATICA, UNSPECIFIED CHRONICITY: ICD-10-CM

## 2022-10-18 DIAGNOSIS — M54.50 RIGHT-SIDED LOW BACK PAIN WITHOUT SCIATICA: ICD-10-CM

## 2022-10-18 PROCEDURE — 99213 OFFICE O/P EST LOW 20 MIN: CPT | Performed by: NURSE PRACTITIONER

## 2022-10-18 PROCEDURE — 72100 X-RAY EXAM L-S SPINE 2/3 VWS: CPT

## 2022-10-18 PROCEDURE — G0463 HOSPITAL OUTPT CLINIC VISIT: HCPCS

## 2022-10-18 RX ORDER — CYCLOBENZAPRINE HCL 5 MG
5 TABLET ORAL 3 TIMES DAILY PRN
Qty: 9 TABLET | Refills: 0 | Status: SHIPPED | OUTPATIENT
Start: 2022-10-18 | End: 2022-10-21

## 2022-10-18 ASSESSMENT — ENCOUNTER SYMPTOMS
NECK PAIN: 0
FATIGUE: 0
ARTHRALGIAS: 1
MYALGIAS: 1
FEVER: 0
SHORTNESS OF BREATH: 0
CHILLS: 0
BACK PAIN: 1
COUGH: 0

## 2022-10-18 ASSESSMENT — ACTIVITIES OF DAILY LIVING (ADL): ADLS_ACUITY_SCORE: 33

## 2022-10-18 NOTE — ED TRIAGE NOTES
Was out bird hunting and slipped and halley back and continues to hurt. Lower right side of back.  Has previous injury on left. Had been taking ibuprofen 800 and is not helping. Feeling spasms in it.  Bending makes it worse

## 2022-10-18 NOTE — DISCHARGE INSTRUCTIONS
Take Tylenol and Ibuprofen rotating around the clock as needed for back pain. I have also sent in for a muscle relaxer.  You can take this over the next 3 days 3 times daily as needed.  Do not drink alcohol or take substances that will make you sleepy while taking this medication.  Do not drive any vehicles or heavy machinery while taking this medication.    Please follow up if your back pain persists, worsens or if you develop any numbness or tingling to your legs, loss of bowel or bladder function or numbness or tingling where you would be seated on the saddle.

## 2022-10-18 NOTE — Clinical Note
Darren Magallon was seen and treated in our emergency department on 10/18/2022.  He may return to work on 10/24/2022.       If you have any questions or concerns, please don't hesitate to call.      Haylie Souza, NP

## 2022-10-18 NOTE — ED TRIAGE NOTES
Pt presents with right lower side back pain from falling and hitting a rock. Pt has had previous injury on right side. Rates current pain 6/10. Denies otc medication today.

## 2023-01-01 ENCOUNTER — LAB (OUTPATIENT)
Dept: LAB | Facility: OTHER | Age: 38
End: 2023-01-01
Payer: COMMERCIAL

## 2023-01-01 ENCOUNTER — HOSPITAL ENCOUNTER (EMERGENCY)
Facility: HOSPITAL | Age: 38
Discharge: HOME | End: 2023-06-12
Payer: COMMERCIAL

## 2023-01-01 ENCOUNTER — NURSE TRIAGE (OUTPATIENT)
Dept: FAMILY MEDICINE | Facility: OTHER | Age: 38
End: 2023-01-01

## 2023-01-01 ENCOUNTER — HOSPITAL ENCOUNTER (EMERGENCY)
Facility: HOSPITAL | Age: 38
Discharge: HOME | End: 2023-05-01
Attending: NURSE PRACTITIONER | Admitting: NURSE PRACTITIONER
Payer: COMMERCIAL

## 2023-01-01 ENCOUNTER — HEALTH MAINTENANCE LETTER (OUTPATIENT)
Age: 38
End: 2023-01-01

## 2023-01-01 ENCOUNTER — TELEPHONE (OUTPATIENT)
Dept: FAMILY MEDICINE | Facility: OTHER | Age: 38
End: 2023-01-01

## 2023-01-01 ENCOUNTER — HOSPITAL ENCOUNTER (EMERGENCY)
Facility: HOSPITAL | Age: 38
Discharge: HOME | End: 2023-06-05
Attending: NURSE PRACTITIONER | Admitting: NURSE PRACTITIONER
Payer: COMMERCIAL

## 2023-01-01 VITALS
WEIGHT: 225.97 LBS | SYSTOLIC BLOOD PRESSURE: 133 MMHG | DIASTOLIC BLOOD PRESSURE: 91 MMHG | HEIGHT: 69 IN | HEART RATE: 89 BPM | RESPIRATION RATE: 18 BRPM | BODY MASS INDEX: 33.47 KG/M2 | TEMPERATURE: 97.3 F | OXYGEN SATURATION: 98 %

## 2023-01-01 VITALS
HEART RATE: 92 BPM | TEMPERATURE: 97.2 F | OXYGEN SATURATION: 97 % | HEIGHT: 69 IN | RESPIRATION RATE: 16 BRPM | WEIGHT: 226 LBS | DIASTOLIC BLOOD PRESSURE: 75 MMHG | SYSTOLIC BLOOD PRESSURE: 131 MMHG | BODY MASS INDEX: 33.47 KG/M2

## 2023-01-01 VITALS
RESPIRATION RATE: 16 BRPM | OXYGEN SATURATION: 98 % | SYSTOLIC BLOOD PRESSURE: 157 MMHG | TEMPERATURE: 97 F | HEART RATE: 96 BPM | DIASTOLIC BLOOD PRESSURE: 88 MMHG

## 2023-01-01 DIAGNOSIS — J01.90 ACUTE SINUSITIS WITH SYMPTOMS > 10 DAYS: Primary | ICD-10-CM

## 2023-01-01 DIAGNOSIS — S05.01XA ABRASION OF RIGHT CORNEA, INITIAL ENCOUNTER: ICD-10-CM

## 2023-01-01 DIAGNOSIS — H10.32 ACUTE CONJUNCTIVITIS OF LEFT EYE: ICD-10-CM

## 2023-01-01 DIAGNOSIS — E11.9 TYPE 2 DIABETES MELLITUS WITHOUT COMPLICATION, WITHOUT LONG-TERM CURRENT USE OF INSULIN (H): ICD-10-CM

## 2023-01-01 DIAGNOSIS — H10.32 ACUTE CONJUNCTIVITIS OF LEFT EYE, UNSPECIFIED ACUTE CONJUNCTIVITIS TYPE: ICD-10-CM

## 2023-01-01 DIAGNOSIS — R05.1 ACUTE COUGH: Primary | ICD-10-CM

## 2023-01-01 DIAGNOSIS — S05.00XA CORNEAL ABRASION: ICD-10-CM

## 2023-01-01 LAB
EST. AVERAGE GLUCOSE BLD GHB EST-MCNC: 177 MG/DL
GROUP A STREP BY PCR: NOT DETECTED
HBA1C MFR BLD: 7.8 %

## 2023-01-01 PROCEDURE — G0463 HOSPITAL OUTPT CLINIC VISIT: HCPCS

## 2023-01-01 PROCEDURE — 87651 STREP A DNA AMP PROBE: CPT

## 2023-01-01 PROCEDURE — 99213 OFFICE O/P EST LOW 20 MIN: CPT

## 2023-01-01 PROCEDURE — 99213 OFFICE O/P EST LOW 20 MIN: CPT | Performed by: NURSE PRACTITIONER

## 2023-01-01 PROCEDURE — 83036 HEMOGLOBIN GLYCOSYLATED A1C: CPT | Mod: ZL

## 2023-01-01 PROCEDURE — 36415 COLL VENOUS BLD VENIPUNCTURE: CPT | Mod: ZL

## 2023-01-01 PROCEDURE — 250N000009 HC RX 250

## 2023-01-01 RX ORDER — POLYMYXIN B SULFATE AND TRIMETHOPRIM 1; 10000 MG/ML; [USP'U]/ML
1-2 SOLUTION OPHTHALMIC EVERY 4 HOURS
Qty: 10 ML | Refills: 0 | Status: SHIPPED | OUTPATIENT
Start: 2023-01-01

## 2023-01-01 RX ORDER — ALBUTEROL SULFATE 90 UG/1
AEROSOL, METERED RESPIRATORY (INHALATION)
Qty: 8.5 G | Refills: 0 | Status: SHIPPED | OUTPATIENT
Start: 2023-01-01

## 2023-01-01 RX ORDER — ERYTHROMYCIN 5 MG/G
0.5 OINTMENT OPHTHALMIC 3 TIMES DAILY
Qty: 10.5 G | Refills: 0 | Status: SHIPPED | OUTPATIENT
Start: 2023-01-01 | End: 2023-01-01

## 2023-01-01 RX ORDER — TETRACAINE HYDROCHLORIDE 5 MG/ML
2 SOLUTION OPHTHALMIC ONCE
Status: COMPLETED | OUTPATIENT
Start: 2023-01-01 | End: 2023-01-01

## 2023-01-01 RX ADMIN — TETRACAINE HYDROCHLORIDE 2 DROP: 5 SOLUTION OPHTHALMIC at 14:26

## 2023-01-01 ASSESSMENT — VISUAL ACUITY
OU: 1
OS: 20/30
OD: 20/25

## 2023-01-01 ASSESSMENT — ENCOUNTER SYMPTOMS
SHORTNESS OF BREATH: 0
PSYCHIATRIC NEGATIVE: 1
VOMITING: 0
CHILLS: 0
EYE REDNESS: 0
HEADACHES: 0
SINUS PAIN: 0
DIARRHEA: 1
EYE REDNESS: 1
CONSTITUTIONAL NEGATIVE: 1
RHINORRHEA: 1
CARDIOVASCULAR NEGATIVE: 1
VOMITING: 0
EYE DISCHARGE: 1
DIARRHEA: 0
SORE THROAT: 1
SINUS PRESSURE: 0
EYE ITCHING: 1
FEVER: 0
MYALGIAS: 0
TROUBLE SWALLOWING: 0
APPETITE CHANGE: 0
FEVER: 0
SORE THROAT: 0
EYE DISCHARGE: 0
NAUSEA: 0
TROUBLE SWALLOWING: 0
EYE DISCHARGE: 1
FATIGUE: 0
COUGH: 1
ACTIVITY CHANGE: 0
COUGH: 1
ABDOMINAL PAIN: 0
SHORTNESS OF BREATH: 0
PHOTOPHOBIA: 1
EYE REDNESS: 1
CHILLS: 0
RESPIRATORY NEGATIVE: 1

## 2023-01-05 ENCOUNTER — NURSE TRIAGE (OUTPATIENT)
Dept: FAMILY MEDICINE | Facility: OTHER | Age: 38
End: 2023-01-05

## 2023-01-05 NOTE — TELEPHONE ENCOUNTER
Patient's wife called for appointment for cough for patient. Wife limited historian due to not being with patient. Unable to give details on duration of symptoms or specific symptoms. Patient scheduled to see covering provider tomorrow 01/06/23. Nurse advised wife to be seen in ED with any difficulty breathing or constant chest pain. Wife verbalized understanding.

## 2023-01-06 ENCOUNTER — OFFICE VISIT (OUTPATIENT)
Dept: FAMILY MEDICINE | Facility: OTHER | Age: 38
End: 2023-01-06
Attending: NURSE PRACTITIONER
Payer: COMMERCIAL

## 2023-01-06 VITALS
TEMPERATURE: 98.6 F | SYSTOLIC BLOOD PRESSURE: 132 MMHG | HEART RATE: 110 BPM | OXYGEN SATURATION: 98 % | RESPIRATION RATE: 20 BRPM | WEIGHT: 222 LBS | BODY MASS INDEX: 32.78 KG/M2 | DIASTOLIC BLOOD PRESSURE: 72 MMHG

## 2023-01-06 DIAGNOSIS — J01.00 SUBACUTE MAXILLARY SINUSITIS: Primary | ICD-10-CM

## 2023-01-06 DIAGNOSIS — J20.9 ACUTE BRONCHITIS, UNSPECIFIED ORGANISM: ICD-10-CM

## 2023-01-06 PROCEDURE — G0463 HOSPITAL OUTPT CLINIC VISIT: HCPCS

## 2023-01-06 PROCEDURE — 99213 OFFICE O/P EST LOW 20 MIN: CPT | Performed by: NURSE PRACTITIONER

## 2023-01-06 RX ORDER — AZITHROMYCIN 250 MG/1
TABLET, FILM COATED ORAL
Qty: 11 TABLET | Refills: 0 | Status: SHIPPED | OUTPATIENT
Start: 2023-01-06 | End: 2023-01-16

## 2023-01-06 ASSESSMENT — PAIN SCALES - GENERAL: PAINLEVEL: NO PAIN (0)

## 2023-01-06 NOTE — PATIENT INSTRUCTIONS
Assessment & Plan          Subacute maxillary sinusitis  - azithromycin (ZITHROMAX) 250 MG tablet; Take 2 tablets (500 mg) by mouth daily for 1 day, THEN 1 tablet (250 mg) daily for 9 days.        Acute bronchitis, unspecified organism  - azithromycin (ZITHROMAX) 250 MG tablet; Take 2 tablets (500 mg) by mouth daily for 1 day, THEN 1 tablet (250 mg) daily for 9 days.        Insure adequate fluid intake  Get plenty of rest  Monitor for temp at home, treat with OTC Tylenol or Ibuprofen per package instruction.  Humidity at home (add bacteriostatic solution to humidifier)  Please return in you do not improve  To UC or ER with persistent, worsening, or concerning symptoms        Barbara Montesinos, CNP  [facility] [place] [facility]

## 2023-01-06 NOTE — PROGRESS NOTES
Assessment & Plan          Subacute maxillary sinusitis  - azithromycin (ZITHROMAX) 250 MG tablet; Take 2 tablets (500 mg) by mouth daily for 1 day, THEN 1 tablet (250 mg) daily for 9 days.        Acute bronchitis, unspecified organism  - azithromycin (ZITHROMAX) 250 MG tablet; Take 2 tablets (500 mg) by mouth daily for 1 day, THEN 1 tablet (250 mg) daily for 9 days.        Insure adequate fluid intake  Get plenty of rest  Monitor for temp at home, treat with OTC Tylenol or Ibuprofen per package instruction.  Humidity at home (add bacteriostatic solution to humidifier)  Please return in you do not improve  To UC or ER with persistent, worsening, or concerning symptoms        Barbara Montesinos CNP  [facility] - [place] is a 37 year oldpresenting for the following health issues:  Cough        Acute Illness  Acute illness concerns: cough   Onset/Duration: cough   Symptoms:  Fever: No  Chills/Sweats: No  Headache (location?): No  Sinus Pressure: YES  Conjunctivitis:  No  Ear Pain: no  Rhinorrhea: YES  Congestion: YES  Sore Throat: No  Cough: YES-productive of yellow sputum  Wheeze: No- chest feels tight with cough   Decreased Appetite: No  Nausea: No  Vomiting: No  Diarrhea: No  Dysuria/Freq.: No  Dysuria or Hematuria: No  Fatigue/Achiness: No  Sick/Strep Exposure: No  Therapies tried and outcome: inhaler, sinus congestion tablets OTC, Nyquil at bedtime           Patient Active Problem List   Diagnosis     Diabetes mellitus, type 2 (H)     No past surgical history on file.    Social History     Tobacco Use     Smoking status: Every Day     Packs/day: 0.50     Types: Cigarettes     Start date: 1999     Smokeless tobacco: Never     Tobacco comments:     pt denies quit plan 8/17/2022   Substance Use Topics     Alcohol use: Yes     Comment: rare     Family History   Problem Relation Age of Onset     Substance Abuse Mother      No Known Problems Father      Diabetes Sister         During pregnancy      Unexplained death Brother         unknown cause in infancy     No Known Problems Maternal Grandmother      No Known Problems Maternal Grandfather      No Known Problems Paternal Grandmother      No Known Problems Paternal Grandfather            Current Outpatient Medications   Medication Sig Dispense Refill     acetaminophen (TYLENOL) 325 MG tablet Take 325-650 mg by mouth every 6 hours as needed for mild pain       aspirin (ASA) 81 MG chewable tablet 81 mg       ibuprofen (ADVIL/MOTRIN) 800 MG tablet Take 1 tablet (800 mg) by mouth every 8 hours as needed for moderate pain 90 tablet 0     metFORMIN (GLUCOPHAGE) 500 MG tablet TAKE 2 TABLETS BY MOUTH TWICE DAILY (WITH MEALS) 120 tablet 0     rosuvastatin (CRESTOR) 5 MG tablet Take 1 tablet (5 mg) by mouth daily 90 tablet 3     imiquimod (ALDARA) 5 % external cream Apply a small sized amount to warts or molluscum three times weekly at bedtime.   Wash off after 8 hours.   May use for up to 16 weeks. 12 packet 3         No Known Allergies       Recent Labs   Lab Test 06/28/22  1515 03/24/22  1645 02/15/22  1055 10/26/18  1944   A1C 6.3* 7.9*  --   --    LDL  --  164*  --   --    HDL  --  35*  --   --    TRIG  --  181*  --   --    ALT  --  50  --   --    CR  --  0.83 0.65* 1.04   GFRESTIMATED  --  >90 >90 82   GFRESTBLACK  --   --   --  >90   POTASSIUM  --  3.8 4.2 3.6          BP Readings from Last 3 Encounters:   01/06/23 132/72   10/18/22 139/92   09/28/22 122/70    Wt Readings from Last 3 Encounters:   01/06/23 100.7 kg (222 lb)   09/16/22 100.2 kg (221 lb)   08/24/22 95.3 kg (210 lb)                 Review of Systems   Constitutional, HEENT, cardiovascular, pulmonary, gi and gu systems are negative, except as otherwise noted.          Objective    /72 (BP Location: Left arm, Patient Position: Sitting, Cuff Size: Adult Regular)   Pulse 110   Temp 98.6  F (37  C) (Tympanic)   Resp 20   Wt 100.7 kg (222 lb)   SpO2 98%   BMI 32.78 kg/m    Body mass index  is 32.78 kg/m .         Physical Exam   GENERAL: healthy, alert and no distress  HENT: sinuses: maxillary, frontal tenderness on bilaterally, PND - yellow  NECK: no adenopathy, no asymmetry, masses, or scars and thyroid normal to palpation  RESP: Deep cough  CV: regular rate and rhythm, normal S1 S2, no S3 or S4, no murmur, click or rub, no peripheral edema and peripheral pulses strong  SKIN: no suspicious lesions or rashes  PSYCH: mentation appears normal, affect normal/bright

## 2023-01-13 ENCOUNTER — OFFICE VISIT (OUTPATIENT)
Dept: FAMILY MEDICINE | Facility: OTHER | Age: 38
End: 2023-01-13
Attending: NURSE PRACTITIONER
Payer: COMMERCIAL

## 2023-01-13 VITALS
HEART RATE: 104 BPM | RESPIRATION RATE: 12 BRPM | OXYGEN SATURATION: 98 % | WEIGHT: 222 LBS | BODY MASS INDEX: 32.88 KG/M2 | SYSTOLIC BLOOD PRESSURE: 110 MMHG | DIASTOLIC BLOOD PRESSURE: 62 MMHG | TEMPERATURE: 98.1 F | HEIGHT: 69 IN

## 2023-01-13 DIAGNOSIS — E11.9 TYPE 2 DIABETES MELLITUS WITHOUT COMPLICATION, WITHOUT LONG-TERM CURRENT USE OF INSULIN (H): ICD-10-CM

## 2023-01-13 LAB
EST. AVERAGE GLUCOSE BLD GHB EST-MCNC: 114 MG/DL
HBA1C MFR BLD: 5.6 %

## 2023-01-13 PROCEDURE — 83036 HEMOGLOBIN GLYCOSYLATED A1C: CPT | Mod: ZL | Performed by: NURSE PRACTITIONER

## 2023-01-13 PROCEDURE — G0463 HOSPITAL OUTPT CLINIC VISIT: HCPCS | Performed by: NURSE PRACTITIONER

## 2023-01-13 PROCEDURE — 99213 OFFICE O/P EST LOW 20 MIN: CPT | Performed by: NURSE PRACTITIONER

## 2023-01-13 PROCEDURE — 36415 COLL VENOUS BLD VENIPUNCTURE: CPT | Mod: ZL | Performed by: NURSE PRACTITIONER

## 2023-01-13 ASSESSMENT — ENCOUNTER SYMPTOMS
FATIGUE: 0
GASTROINTESTINAL NEGATIVE: 1
CARDIOVASCULAR NEGATIVE: 1
EYES NEGATIVE: 1
FEVER: 0
POLYDIPSIA: 0
PARESTHESIAS: 0
POLYPHAGIA: 0
NUMBNESS: 0
RESPIRATORY NEGATIVE: 1
CHILLS: 0

## 2023-01-13 NOTE — PROGRESS NOTES
Assessment & Plan     Type 2 diabetes mellitus without complication, without long-term current use of insulin (H)  No change to plan of care. Metformin reordered through other encounter 1/13/22 x 6 months.   - HEMOGLOBIN A1C; Standing  - HEMOGLOBIN A1C  Follow up in 6 months. Will be due for DM foot exam at that time.     Ordering of each unique test       Return in about 6 months (around 7/13/2023).    Pebbles Cunningham, CNP  [facility] - [place] is a 37 year old accompanied by his daughter, presenting for the following health issues:  Diabetes      HPI     Diabetes Follow-up      How often are you checking your blood sugar? Not at all    What concerns do you have today about your diabetes? None     Do you have any of these symptoms? (Select all that apply)  No numbness or tingling in feet.  No redness, sores or blisters on feet.  No complaints of excessive thirst.  No reports of blurry vision.  No significant changes to weight.    Have you had a diabetic eye exam in the last 12 months? No    Eye doctor: Due for appointment. Planning to see [facility] this February.           BP Readings from Last 2 Encounters:   01/13/23 110/62   01/06/23 132/72     Hemoglobin A1C (%)   Date Value   01/13/2023 5.6   06/28/2022 6.3 (H)     LDL Cholesterol Calculated (mg/dL)   Date Value   03/24/2022 164 (H)         Hyperlipidemia Follow-Up      Are you regularly taking any medication or supplement to lower your cholesterol?   Yes- crestor    Are you having muscle aches or other side effects that you think could be caused by your cholesterol lowering medication?  No        ADVANCED CARE PLANNING : Reviewed. Honoring provided. No specific change to plan of care from ACP perspective at this time. Full code.     Review of Systems   Constitutional: Negative for chills, fatigue and fever.   HENT:        Getting over sinus infection and bronchitis.    Eyes: Negative.    Respiratory: Negative.   "  Cardiovascular: Negative.    Gastrointestinal: Negative.    Endocrine: Negative for polydipsia, polyphagia and polyuria.   Neurological: Negative for numbness and paresthesias.      Constitutional, HEENT, cardiovascular, pulmonary, gi and gu systems are negative, except as otherwise noted.      Objective    /62 (BP Location: Right arm, Patient Position: Sitting, Cuff Size: Adult Large)   Pulse 104   Temp 98.1  F (36.7  C) (Tympanic)   Resp 12   Ht 1.753 m (5' 9\")   Wt 100.7 kg (222 lb)   SpO2 98%   BMI 32.78 kg/m    Body mass index is 32.78 kg/m .  Physical Exam   GENERAL: healthy, alert and no distress  EYES: Eyes grossly normal to inspection, PERRL and conjunctivae and sclerae normal  NECK: no adenopathy, no asymmetry, masses, or scars and thyroid normal to palpation  RESP: lungs clear to auscultation - no rales, rhonchi or wheezes  CV: regular rate and rhythm, normal S1 S2, no S3 or S4, no murmur, click or rub, no peripheral edema and peripheral pulses strong  ABDOMEN: soft, nontender, no hepatosplenomegaly, no masses and bowel sounds normal  MS: no gross musculoskeletal defects noted, no edema  NEURO: Normal strength and tone, mentation intact and speech normal  PSYCH: mentation appears normal, affect normal/bright  Up to date on DM foot exam.     Results for orders placed or performed in visit on 01/13/23   HEMOGLOBIN A1C     Status: None   Result Value Ref Range    Estimated Average Glucose 114 mg/dL    Hemoglobin A1C 5.6 <5.7 %                 "

## 2023-02-02 NOTE — PROGRESS NOTES
[facility]    Outpatient Physical Therapy Discharge Note  Patient: Darren Magallon  : 1985    Beginning/End Dates of Reporting Period:  10/005/22 to 23    Referring Provider: Dr. Nur    Therapy Diagnosis: L shoulder impingement     Client Self Report: Initial eval completed-see report    Objective Measurements:                                          Outcome Measures (most recent score):      Goals:  Goal Identifier STG 1   Goal Description Decreased pain when at its worst to a 7/10   Target Date 10/19/22   Date Met      Progress (detail required for progress note):       Goal Identifier LTG 1   Goal Description Patient will demonstrate independence with home exercise program   Target Date 22   Date Met      Progress (detail required for progress note):       Goal Identifier LTG #2   Goal Description Patient was able to reach up to a high shelf with little to no difficulty   Target Date 22   Date Met      Progress (detail required for progress note):       Goal Identifier     Goal Description     Target Date     Date Met      Progress (detail required for progress note):       Goal Identifier     Goal Description     Target Date     Date Met      Progress (detail required for progress note):       Goal Identifier     Goal Description     Target Date     Date Met      Progress (detail required for progress note):       Goal Identifier     Goal Description     Target Date     Date Met      Progress (detail required for progress note):       Goal Identifier     Goal Description     Target Date     Date Met      Progress (detail required for progress note):             Plan:  Discharge from therapy.    Discharge:    Reason for Discharge: Patient has failed to schedule further appointments.    Equipment Issued: none    Discharge Plan: Discharge from PT. He was seen for one eval and treat  session and did not schedule any follow up appts

## 2023-02-17 ENCOUNTER — TRANSFERRED RECORDS (OUTPATIENT)
Dept: HEALTH INFORMATION MANAGEMENT | Facility: CLINIC | Age: 38
End: 2023-02-17

## 2023-02-17 LAB — RETINOPATHY: NEGATIVE

## 2023-05-01 NOTE — ED TRIAGE NOTES
Pt presents with c/o head pressure, cough, stuffy nose, ears feel plugged, chest pressure while cough (only while coughing, no pain at rest). Sx started a week and a half. Spouse states hes prone to getting bronchitis. Denies hx of seasonal allergies. No otc meds taken.

## 2023-05-01 NOTE — ED PROVIDER NOTES
History     Chief Complaint   Patient presents with     URI     HPI  Darren Magallon is a 37 year old male who presents to urgent care today (ambulatory) with complaints of congestion, ear pain, rhinorrhea, cough and diarrhea.  Congestion, ear pain, rhinorrhea and cough started 10 days ago.  No history of seasonal allergies.  Staying hydrated.  No rashes.  No over-the-counter medication.  No other concerns.    Allergies:  No Known Allergies    Problem List:    Patient Active Problem List    Diagnosis Date Noted     Diabetes mellitus, type 2 (H) 03/25/2022     Priority: Medium        Past Medical History:    No past medical history on file.    Past Surgical History:    No past surgical history on file.    Family History:    Family History   Problem Relation Age of Onset     Substance Abuse Mother      No Known Problems Father      Diabetes Sister         During pregnancy     Unexplained death Brother         unknown cause in infancy     No Known Problems Maternal Grandmother      No Known Problems Maternal Grandfather      No Known Problems Paternal Grandmother      No Known Problems Paternal Grandfather        Social History:  Marital Status:  Single [1]  Social History     Tobacco Use     Smoking status: Every Day     Packs/day: 0.50     Types: Cigarettes     Start date: 1999     Smokeless tobacco: Never     Tobacco comments:     pt denies quit plan 8/17/2022   Vaping Use     Vaping status: Never Used   Substance Use Topics     Alcohol use: Yes     Comment: rare     Drug use: No        Medications:    acetaminophen (TYLENOL) 325 MG tablet  amoxicillin-clavulanate (AUGMENTIN) 875-125 MG tablet  aspirin (ASA) 81 MG chewable tablet  ibuprofen (ADVIL/MOTRIN) 800 MG tablet  metFORMIN (GLUCOPHAGE) 500 MG tablet  rosuvastatin (CRESTOR) 5 MG tablet      Review of Systems   Constitutional: Negative for chills and fever.   HENT: Positive for congestion, ear pain and rhinorrhea. Negative for sinus pressure, sinus pain,  "sore throat and trouble swallowing.    Eyes: Negative for discharge and redness.   Respiratory: Positive for cough. Negative for shortness of breath.    Cardiovascular: Negative for chest pain.   Gastrointestinal: Positive for diarrhea. Negative for vomiting.   Musculoskeletal: Negative for gait problem and myalgias.   Skin: Negative for rash.   Neurological: Negative for headaches.   Psychiatric/Behavioral: Negative.      Physical Exam   BP: 131/75  Pulse: 92  Temp: 97.2  F (36.2  C)  Resp: 16  Height: 175.3 cm (5' 9\")  Weight: 102.5 kg (226 lb)  SpO2: 97 %    Physical Exam  Vitals and nursing note reviewed.   Constitutional:       General: He is not in acute distress.     Appearance: Normal appearance. He is not ill-appearing or toxic-appearing.   HENT:      Head: Normocephalic.      Right Ear: Tympanic membrane, ear canal and external ear normal.      Left Ear: Tympanic membrane, ear canal and external ear normal.      Nose: Congestion and rhinorrhea present.      Mouth/Throat:      Mouth: Mucous membranes are moist.      Pharynx: Oropharynx is clear. No oropharyngeal exudate or posterior oropharyngeal erythema.   Cardiovascular:      Rate and Rhythm: Normal rate and regular rhythm.      Pulses: Normal pulses.      Heart sounds: Normal heart sounds.   Pulmonary:      Effort: Pulmonary effort is normal.      Breath sounds: Normal breath sounds.   Abdominal:      General: Bowel sounds are normal.      Palpations: Abdomen is soft.      Tenderness: There is no abdominal tenderness.   Neurological:      Mental Status: He is alert.   Psychiatric:         Mood and Affect: Mood normal.       ED Course     No results found for this or any previous visit (from the past 24 hour(s)).    Medications - No data to display    Assessments & Plan (with Medical Decision Making)     I have reviewed the nursing notes.    I have reviewed the findings, diagnosis, plan and need for follow up with the patient.  (J01.90) Acute sinusitis " with symptoms > 10 days  (primary encounter diagnosis)  Plan:   Patient ambulatory with a nontoxic appearance.  Lungs clear throughout.  No signs of otitis media or strep.  Patient largely congested, will treat sinusitis with Augmentin as it has been over 10 days.  Patient to attempt over-the-counter Flonase as needed for nasal congestion.  Push fluids to stay hydrated.  Over-the-counter cough medication as needed for cough.  Patient to follow-up with primary care provider or return to urgent care/ED with any worsening in condition or additional concerns.  Patient is in agreement with treatment plan.  Work note given per request.    New Prescriptions    AMOXICILLIN-CLAVULANATE (AUGMENTIN) 875-125 MG TABLET    Take 1 tablet by mouth 2 times daily for 7 days     Final diagnoses:   Acute sinusitis with symptoms > 10 days     5/1/2023   [facility]     Emma Morales NP  05/01/23 1052

## 2023-05-01 NOTE — DISCHARGE INSTRUCTIONS
Augmentin as ordered  - Take entire course of antibiotic even if you start to feel better.  - Antibiotics can cause stomach upset including nausea and diarrhea. Read your bottle or ask the pharmacist if antibiotic can be taken with food to help prevent nausea. If you have symptoms of diarrhea you can take an over-the-counter probiotic and/or increase foods with probiotics such as yogurt, [place], sauerkraut.     Symptomatic treatments recommended.  - Ensure you are staying hydrated by drinking plenty of fluids or eating foods such as popsicles, jello, pudding.  - Honey can be soothing for sore throat  - Warm salt water gurgles can help soothe sore throat  - Rest  - Humidifier can help with congestion and help keep mucus membranes such as throat and nose from drying out.  - Sleeping slightly propped up can help with congestion and postnasal drainage that can worsen cough at bedtime.  - As long as you have never been told to take Tylenol and/or Ibuprofen you can use them to manage fever and body aches per package instructions  Make sure you eat when you take ibuprofen to avoid stomach upset.  - OTC cough medications per package instructions to help with cough. Check to see if the cough/cold medication already has acetaminophen (Tylenol) in it. If it does avoid taking additional Tylenol.  - If sudden onset of new fever, worsening symptoms return for further evaluation.  - OTC nasal steroid such as Flonase can help decrease sinus inflammation to help with congestion.    Follow up with primary care provider or return to urgent care/ED with any worsening in condition or additional concerns.

## 2023-05-01 NOTE — Clinical Note
Darren Magallon was seen and treated in our emergency department on 5/1/2023.  He may return to work on 05/04/2023.       If you have any questions or concerns, please don't hesitate to call.      Emma Morales, NP

## 2023-05-04 NOTE — TELEPHONE ENCOUNTER
Overbook request came in [facility] will see patient tomorrow 5-5-2023 in telephone visit spot. Called patient left message to call back to schedule.

## 2023-05-04 NOTE — TELEPHONE ENCOUNTER
Protocol advises home care for hyperglycemia.   Spouse reports patient's BG was 241 about an hour ago and two hours ago BG was 231.   Spouse reports patient having difficulty breathing, being shaky, and weakness this morning, but symptoms have resolved.   Spouse reports symptoms improved after patient ate food, but is concerned that BG went up to 241 from 231 after eating.   Spouse and patient requesting an appointment for tomorrow with PCP.   No available appt. Offered appt for Monday, patient and spouse declined and requesting overbook request or recommendation from PCP.   Please advise, thank you.     Reason for Disposition    [1] Blood glucose 240 - 300 mg/dL (13.3 - 16.7 mmol/L) AND [2] does not  use insulin (e.g., not insulin-dependent; most people with type 2 diabetes)    Additional Information    Negative: Unconscious or difficult to awaken    Negative: Acting confused (e.g., disoriented, slurred speech)    Negative: Very weak (e.g., can't stand)    Negative: Sounds like a life-threatening emergency to the triager    Negative: [1] Vomiting AND [2] signs of dehydration (e.g., very dry mouth, lightheaded, dark urine)    Negative: [1] Blood glucose > 240 mg/dL (13.3 mmol/L) AND [2] rapid breathing    Negative: Blood glucose > 500 mg/dL (27.8 mmol/L)    Negative: [1] Blood glucose > 240 mg/dL (13.3 mmol/L) AND [2] urine ketones moderate-large (or more than 1+)    Negative: [1] Blood glucose > 240 mg/dL (13.3 mmol/L) AND [2] blood ketones > 1.4 mmol/L    Negative: [1] Blood glucose > 240 mg/dL (13.3 mmol/L) AND [2] vomiting AND [3] unable to check for ketones (in blood or urine)    Negative: [1] New-onset diabetes suspected (e.g., frequent urination, weak, weight loss) AND [2] vomiting or rapid breathing    Negative: Vomiting lasts > 4 hours    Negative: Patient sounds very sick or weak to the triager    Negative: Fever > 100.4 F (38.0 C)    Negative: Blood glucose > 400 mg/dL (22.2 mmol/L)    Negative: [1] Blood  "glucose > 300 mg/dL (16.7 mmol/L) AND [2] two or more times in a row    Negative: Urine ketones moderate - large (or blood ketones > 1.4 mmol/L)    Negative: [1] Caller has URGENT medication or insulin pump question AND [2] triager unable to answer question    Negative: [1] Symptoms of high blood sugar (e.g., frequent urination, weak, weight loss) AND [2] not able to test blood glucose    Negative: New-onset diabetes suspected (e.g., frequent urination, weakness, weight loss)    Negative: [1] Caller has NON-URGENT medication or insulin pump question AND [2] triager unable to answer question    Negative: [1] Blood glucose > 300 mg/dL (16.7 mmol/L) AND [2] uses insulin (e.g., insulin-dependent, all people with type 1 diabetes)    Negative: [1] Blood glucose 240 - 300 mg/dL (13.3 - 16.7 mmol/L) AND [2] uses insulin (e.g., insulin-dependent, all people with type 1 diabetes)    Negative: [1] Blood glucose > 300 mg/dL (16.7 mmol/L) AND [2] does not  use insulin (e.g., not insulin-dependent; most people with type 2 diabetes)    Answer Assessment - Initial Assessment Questions  1. BLOOD GLUCOSE: \"What is your blood glucose level?\"       241 and an hour prior BG was 231  2. ONSET: \"When did you check the blood glucose?\"      About an hour ago  3. USUAL RANGE: \"What is your glucose level usually?\" (e.g., usual fasting morning value, usual evening value)      100's   4. KETONES: \"Do you check for ketones (urine or blood test strips)?\" If yes, ask: \"What does the test show now?\"       No   5. TYPE 1 or 2:  \"Do you know what type of diabetes you have?\"  (e.g., Type 1, Type 2, Gestational; doesn't know)       Type 2  6. INSULIN: \"Do you take insulin?\" \"What type of insulin(s) do you use? What is the mode of delivery? (syringe, pen; injection or pump)?\"       No   7. DIABETES PILLS: \"Do you take any pills for your diabetes?\" If yes, ask: \"Have you missed taking any pills recently?\"      Metformin   8. OTHER SYMPTOMS: \"Do you have " "any symptoms?\" (e.g., fever, frequent urination, difficulty breathing, dizziness, weakness, vomiting)      Weakness and difficulty breathing this morning, but has resolved  9. PREGNANCY: \"Is there any chance you are pregnant?\" \"When was your last menstrual period?\"      NA    Protocols used: DIABETES - HIGH BLOOD SUGAR-A-AH      "

## 2023-05-05 NOTE — TELEPHONE ENCOUNTER
Discontinued Therapy completed (No AVS) Yudelka Damon LPN 9/16/22 1545     albuterol (PROAIR HFA) 108 (90 Base) MCG/ACT inhaler      Last Written Prescription Date:  1/24/2022  Last Fill Quantity: 8.5 g,   # refills: 0  Last Office Visit: 1/13/2023  Future Office visit:

## 2023-05-05 NOTE — TELEPHONE ENCOUNTER
Please notify of order for albuterol. I do recommend a follow up appointment next Week.  Needs follow up visit for glucoses as well. If any SOB at rest please send to ER.

## 2023-05-12 NOTE — NURSING NOTE
"Chief Complaint   Patient presents with     Plantar Wart       Initial /78 (BP Location: Left arm, Patient Position: Sitting, Cuff Size: Adult Regular)   Pulse 98   Temp 97.7  F (36.5  C) (Tympanic)   SpO2 99%  Estimated body mass index is 32.34 kg/m  as calculated from the following:    Height as of 6/28/22: 1.753 m (5' 9\").    Weight as of 6/28/22: 99.3 kg (219 lb).  Medication Reconciliation: complete  Kalpana Chavira LPN  "
no

## 2023-06-05 NOTE — LETTER
[facility]  [address] [address]  Phone: [phone]    June 5, 2023        Darren Magallon  [address]  [facility] [address]          To whom it may concern:    RE: Darren Magallon    Patient was seen and treated today at our facility today. Please excuse him from work on 6/6/23. He can return to work on 6/7/23.     Please contact me for questions or concerns.      Sincerely,        RUSTY Palma CNP   Certified Nurse Practitioner

## 2023-06-06 NOTE — ED TRIAGE NOTES
States he woke this morning and had crust on left eye and has had drainage from eye throughout thed day.      Triage Assessment     Row Name 06/05/23 2017       Triage Assessment (Adult)    Airway WDL WDL

## 2023-06-06 NOTE — ED PROVIDER NOTES
History     Chief Complaint   Patient presents with     Eye Problem     HPI  Darren Magallon is a 37 year old male who presents with right eye redness/drainage that started this morning. Has had ongoing purulent drainage throughout the day. Surface of eye feels irritated. Denies any trauma and states he does not recall any incident where a foreign object came in contact with his eye. No known exposure to others with conjunctivitis. Denies any other cold/allergy symptoms.   Does report blurry vision when drainage is present, otherwise no other vision changes. Denies any deep eyeball pain.    Home treatment: rinsed eyes with an eye saline irrigation  Does not wear any contacts.     Allergies:  No Known Allergies    Problem List:    Patient Active Problem List    Diagnosis Date Noted     Diabetes mellitus, type 2 (H) 03/25/2022     Priority: Medium        Past Medical History:    History reviewed. No pertinent past medical history.    Past Surgical History:    History reviewed. No pertinent surgical history.    Family History:    Family History   Problem Relation Age of Onset     Substance Abuse Mother      No Known Problems Father      Diabetes Sister         During pregnancy     Unexplained death Brother         unknown cause in infancy     No Known Problems Maternal Grandmother      No Known Problems Maternal Grandfather      No Known Problems Paternal Grandmother      No Known Problems Paternal Grandfather        Social History:  Marital Status:  Single [1]  Social History     Tobacco Use     Smoking status: Every Day     Packs/day: 0.50     Types: Cigarettes     Start date: 1999     Smokeless tobacco: Never     Tobacco comments:     pt denies quit plan 8/17/2022   Vaping Use     Vaping status: Never Used   Substance Use Topics     Alcohol use: Yes     Comment: rare     Drug use: No        Medications:    acetaminophen (TYLENOL) 325 MG tablet  aspirin (ASA) 81 MG chewable tablet  ibuprofen (ADVIL/MOTRIN) 800 MG  tablet  metFORMIN (GLUCOPHAGE) 500 MG tablet  PROAIR  (90 Base) MCG/ACT inhaler  trimethoprim-polymyxin b (POLYTRIM) 82710-2.1 UNIT/ML-% ophthalmic solution  rosuvastatin (CRESTOR) 5 MG tablet          Review of Systems   Constitutional: Negative.    HENT: Negative.    Eyes: Positive for photophobia, discharge and redness.   Respiratory: Negative.    Cardiovascular: Negative.    Skin: Negative.        Physical Exam   BP: 157/88  Pulse: 96  Temp: 97  F (36.1  C)  Resp: 16  SpO2: 98 %      Physical Exam  Vitals and nursing note reviewed.   Constitutional:       General: He is not in acute distress.     Appearance: Normal appearance. He is not ill-appearing.   HENT:      Head: Normocephalic and atraumatic.      Right Ear: Tympanic membrane normal.      Left Ear: Tympanic membrane normal.      Nose: Nose normal.      Mouth/Throat:      Mouth: Mucous membranes are moist.   Eyes:      Extraocular Movements: Extraocular movements intact.      Pupils: Pupils are equal, round, and reactive to light.      Comments: Left eye with conjunctival injection and purulent drainage. Slight erythema on upper/lower lid, which he states is due to rubbing. No appreciated periorbital edema. No foreign object visualized.    Cardiovascular:      Rate and Rhythm: Normal rate and regular rhythm.   Pulmonary:      Effort: Pulmonary effort is normal.      Breath sounds: Normal breath sounds.   Musculoskeletal:      Cervical back: Normal range of motion and neck supple.   Neurological:      Mental Status: He is alert and oriented to person, place, and time.   Psychiatric:         Mood and Affect: Mood normal.         Behavior: Behavior normal.         ED Course       No results found for this or any previous visit (from the past 24 hour(s)).    Medications - No data to display    Assessments & Plan (with Medical Decision Making)   37-year-old male with symptoms of redness and purulent drainage in the left eye that started this morning.  No  obvious new exposure, no trauma, no reported foreign object.  Denies eyeball pain and denies vision changes when drainage is absent.  No other cold or allergy symptoms.  He does not wear any contacts.  Findings consistent with a bacterial conjunctivitis.    I have reviewed the nursing notes.  I have reviewed the findings, diagnosis, plan and need for follow up with the patient.  Instructions:   Complete antibiotic drops as instructed.  You are considered contagious until being on them for at least 24 hours.  Practice good handwashing.    Seek immediate medical care if there is any new concerning or worsening symptoms.  He agrees with plan of care and verbalized understanding.    Discharge Medication List as of 6/5/2023  8:41 PM      START taking these medications    Details   trimethoprim-polymyxin b (POLYTRIM) 37667-3.1 UNIT/ML-% ophthalmic solution Place 1-2 drops Into the left eye every 4 hours Use for 7 days., Disp-10 mL, R-0, [facility]             Final diagnoses:   Acute conjunctivitis of left eye       6/5/2023   [facility]     Jyoti Gardner, RUSTY CNP  06/05/23 4624

## 2023-06-06 NOTE — ED TRIAGE NOTES
Pt presents with c/o pink eye sx  States that he woke up this am with his left eye being crusted shut,and has had drainage through out  the day today. Left eye is very red same with the lids, feels like there is sand in his eye.    Did use visine drops at work

## 2023-06-06 NOTE — DISCHARGE INSTRUCTIONS
Complete antibiotic drops as instructed.  You are considered contagious until being on them for at least 24 hours.  Practice good handwashing.    Seek immediate medical care if there is any new concerning or worsening symptoms.

## 2023-06-12 NOTE — DISCHARGE INSTRUCTIONS
Please follow up with eye doctor this week.    Tylenol and ibuprofen as needed for pain.     Eye ointment three times daily     Return with any visual changes, pain, fevers, or concerns

## 2023-06-12 NOTE — ED PROVIDER NOTES
History     Chief Complaint   Patient presents with     Conjunctivitis     HPI  Darren Magallon is a 37 year old male who presents to the urgent care with a one week history of bilateral eye redness and itching. He was seen on 6/5 and began treatment for left sided conjunctivitis. The redness and itching then moved to the right eye. He has been applying polytrim without relief. He does note some purulent drainage and notes that symptoms were starting to improve initially. He also complains of a cough and sore throat. He denies fevers, chills, blurred vision, and FB to eyes. He does not wear contacts or glasses.     Allergies:  No Known Allergies    Problem List:    Patient Active Problem List    Diagnosis Date Noted     Diabetes mellitus, type 2 (H) 03/25/2022     Priority: Medium        Past Medical History:    No past medical history on file.    Past Surgical History:    No past surgical history on file.    Family History:    Family History   Problem Relation Age of Onset     Substance Abuse Mother      No Known Problems Father      Diabetes Sister         During pregnancy     Unexplained death Brother         unknown cause in infancy     No Known Problems Maternal Grandmother      No Known Problems Maternal Grandfather      No Known Problems Paternal Grandmother      No Known Problems Paternal Grandfather        Social History:  Marital Status:  Single [1]  Social History     Tobacco Use     Smoking status: Every Day     Packs/day: 0.50     Types: Cigarettes     Start date: 1999     Smokeless tobacco: Never     Tobacco comments:     pt denies quit plan 8/17/2022   Vaping Use     Vaping status: Never Used   Substance Use Topics     Alcohol use: Yes     Comment: rare     Drug use: No        Medications:    erythromycin (ROMYCIN) 5 MG/GM ophthalmic ointment  acetaminophen (TYLENOL) 325 MG tablet  aspirin (ASA) 81 MG chewable tablet  ibuprofen (ADVIL/MOTRIN) 800 MG tablet  metFORMIN (GLUCOPHAGE) 500 MG  "tablet  PROAIR  (90 Base) MCG/ACT inhaler  rosuvastatin (CRESTOR) 5 MG tablet  trimethoprim-polymyxin b (POLYTRIM) 32829-1.1 UNIT/ML-% ophthalmic solution          Review of Systems   Constitutional: Negative for activity change, appetite change, chills, fatigue and fever.   HENT: Positive for sore throat. Negative for ear pain and trouble swallowing.    Eyes: Positive for discharge, redness, itching and visual disturbance.   Respiratory: Positive for cough. Negative for shortness of breath.    Cardiovascular: Negative for chest pain.   Gastrointestinal: Negative for abdominal pain, diarrhea, nausea and vomiting.   All other systems reviewed and are negative.      Physical Exam   BP: 133/91  Pulse: 89  Temp: 97.3  F (36.3  C)  Resp: 18  Height: 175.3 cm (5' 9\")  Weight: 102.5 kg (225 lb 15.5 oz)  SpO2: 98 %      Physical Exam  Vitals and nursing note reviewed.   Constitutional:       General: He is not in acute distress.     Appearance: Normal appearance. He is not ill-appearing.   HENT:      Right Ear: Tympanic membrane, ear canal and external ear normal. There is no impacted cerumen.      Left Ear: Tympanic membrane, ear canal and external ear normal. There is no impacted cerumen.      Nose: No congestion or rhinorrhea.      Mouth/Throat:      Mouth: Mucous membranes are moist.      Pharynx: Oropharynx is clear. Posterior oropharyngeal erythema present. No oropharyngeal exudate.   Eyes:      General: Lids are normal. Lids are everted, no foreign bodies appreciated. Vision grossly intact. Gaze aligned appropriately.         Right eye: No discharge.         Left eye: No discharge.      Extraocular Movements: Extraocular movements intact.      Right eye: Normal extraocular motion and no nystagmus.      Left eye: Normal extraocular motion and no nystagmus.      Conjunctiva/sclera:      Right eye: Right conjunctiva is injected. No chemosis, exudate or hemorrhage.     Left eye: Left conjunctiva is injected. No " chemosis, exudate or hemorrhage.     Pupils:      Right eye: Pupil is round, reactive and not sluggish. Corneal abrasion present.      Left eye: Pupil is round, reactive and not sluggish. Corneal abrasion present.     Cardiovascular:      Rate and Rhythm: Normal rate and regular rhythm.      Pulses: Normal pulses.      Heart sounds: Normal heart sounds. No murmur heard.  Pulmonary:      Effort: Pulmonary effort is normal. No respiratory distress.      Breath sounds: Normal breath sounds. No stridor. No wheezing, rhonchi or rales.   Chest:      Chest wall: No tenderness.   Lymphadenopathy:      Cervical: No cervical adenopathy.   Skin:     General: Skin is warm and dry.      Capillary Refill: Capillary refill takes less than 2 seconds.   Neurological:      Mental Status: He is alert.         ED Course                 Procedures              Results for orders placed or performed during the hospital encounter of 06/12/23 (from the past 24 hour(s))   Group A Streptococcus PCR Throat Swab    Specimen: Throat; Swab   Result Value Ref Range    Group A strep by PCR Not Detected Not Detected    Narrative    The Xpert Xpress Strep A test, performed on the [employer], is a rapid, qualitative in vitro diagnostic test for the detection of Streptococcus pyogenes (Group A ß-hemolytic Streptococcus, Strep A) in throat swab specimens from patients with signs and symptoms of pharyngitis. The Xpert Xpress Strep A test can be used as an aid in the diagnosis of Group A Streptococcal pharyngitis. The assay is not intended to monitor treatment for Group A Streptococcus infections. The Xpert Xpress Strep A test utilizes an automated real-time polymerase chain reaction (PCR) to detect Streptococcus pyogenes DNA.       Medications   tetracaine (PONTOCAINE) 0.5 % ophthalmic solution 2 drop (2 drops Both Eyes $Given 6/12/23 9068)       Assessments & Plan (with Medical Decision Making)     I have reviewed the nursing  notes.    I have reviewed the findings, diagnosis, plan and need for follow up with the patient.  Darren Magallon is a 37 year old male who presents to the urgent care with a one week history of bilateral eye redness and itching. He was seen on 6/5 and began treatment for left sided conjunctivitis. The redness and itching then moved to the right eye. He has been applying polytrim without relief. He does note some purulent drainage and notes that symptoms were starting to improve initially. He also complains of a cough and sore throat. He denies fevers, chills, blurred vision, and FB to eyes. He does not wear contacts or glasses.     MDM: strep negative.   VSS and afebrile. Lungs clear, heart tones regular. Mild erythema noted to posterior oropharynx. No trismus or visible peritonsillar abscess. fluorecin staining of eyes performed with bilateral corneal abrasions at 6 o'clock. Most likely corneal abrasions secondary to rubbing eyes to relieve itching and irritation from conjunctivitis. Will treat with erythromycin ointment. He will visit with eye doctor today to schedule an eye exam.    (S05.00XA) Corneal abrasion  Plan: erythromycin prescribed. Please follow up with eye doctor this week.    Tylenol and ibuprofen as needed for pain.     Eye ointment three times daily     Return with any visual changes, pain, fevers, or concerns. Understanding verbalized.           Discharge Medication List as of 6/12/2023  2:46 PM      START taking these medications    Details   erythromycin (ROMYCIN) 5 MG/GM ophthalmic ointment Place 0.5 inches into both eyes 3 times daily for 7 daysDisp-10.5 g, [id number]             Final diagnoses:   Corneal abrasion       6/12/2023   [facility]     Karyna Champagne NP  06/12/23 6116

## 2023-06-12 NOTE — ED TRIAGE NOTES
Reports completing treatment for pink eye this past week with no relief in symptoms. Bilateral eye redness, congestion and sore throat. No injury to eyes. No vision changes.

## 2023-06-12 NOTE — ED TRIAGE NOTES
Pt presents with c/o bilateral eye redness and irritation, congestion, and sore throat. Sx started last week, sore throat and congestion started a couple days ago. Reports  He finished eye drops with no relief. No otc meds.

## 2023-06-12 NOTE — Clinical Note
Darren Magallon was seen and treated in our emergency department on 6/12/2023.  He may return to work on 06/15/2023.       If you have any questions or concerns, please don't hesitate to call.      Karyna Champagne, NP